# Patient Record
Sex: MALE | Race: ASIAN | NOT HISPANIC OR LATINO | Employment: FULL TIME | URBAN - METROPOLITAN AREA
[De-identification: names, ages, dates, MRNs, and addresses within clinical notes are randomized per-mention and may not be internally consistent; named-entity substitution may affect disease eponyms.]

---

## 2019-05-24 ENCOUNTER — ANCILLARY PROCEDURE (OUTPATIENT)
Dept: GENERAL RADIOLOGY | Facility: CLINIC | Age: 29
End: 2019-05-24
Attending: INTERNAL MEDICINE
Payer: COMMERCIAL

## 2019-05-24 ENCOUNTER — OFFICE VISIT (OUTPATIENT)
Dept: FAMILY MEDICINE | Facility: CLINIC | Age: 29
End: 2019-05-24
Payer: COMMERCIAL

## 2019-05-24 VITALS
BODY MASS INDEX: 21.67 KG/M2 | SYSTOLIC BLOOD PRESSURE: 124 MMHG | OXYGEN SATURATION: 99 % | HEART RATE: 78 BPM | HEIGHT: 67 IN | WEIGHT: 138.1 LBS | TEMPERATURE: 98.7 F | DIASTOLIC BLOOD PRESSURE: 70 MMHG

## 2019-05-24 DIAGNOSIS — R05.9 COUGH: ICD-10-CM

## 2019-05-24 DIAGNOSIS — J40 BRONCHITIS: ICD-10-CM

## 2019-05-24 DIAGNOSIS — Z13.29 SCREENING FOR THYROID DISORDER: ICD-10-CM

## 2019-05-24 DIAGNOSIS — R53.82 CHRONIC FATIGUE: ICD-10-CM

## 2019-05-24 DIAGNOSIS — Z83.3 FAMILY HISTORY OF DIABETES MELLITUS: ICD-10-CM

## 2019-05-24 DIAGNOSIS — R05.9 COUGH: Primary | ICD-10-CM

## 2019-05-24 DIAGNOSIS — Z13.1 SCREENING FOR DIABETES MELLITUS: ICD-10-CM

## 2019-05-24 LAB
ANION GAP SERPL CALCULATED.3IONS-SCNC: 9 MMOL/L (ref 3–14)
BASOPHILS # BLD AUTO: 0 10E9/L (ref 0–0.2)
BASOPHILS NFR BLD AUTO: 0.2 %
BUN SERPL-MCNC: 12 MG/DL (ref 7–30)
CALCIUM SERPL-MCNC: 9.9 MG/DL (ref 8.5–10.1)
CHLORIDE SERPL-SCNC: 102 MMOL/L (ref 94–109)
CO2 SERPL-SCNC: 28 MMOL/L (ref 20–32)
CREAT SERPL-MCNC: 0.89 MG/DL (ref 0.66–1.25)
DIFFERENTIAL METHOD BLD: NORMAL
EOSINOPHIL # BLD AUTO: 0.7 10E9/L (ref 0–0.7)
EOSINOPHIL NFR BLD AUTO: 6.5 %
ERYTHROCYTE [DISTWIDTH] IN BLOOD BY AUTOMATED COUNT: 14 % (ref 10–15)
GFR SERPL CREATININE-BSD FRML MDRD: >90 ML/MIN/{1.73_M2}
GLUCOSE SERPL-MCNC: 96 MG/DL (ref 70–99)
HBA1C MFR BLD: 5 % (ref 0–5.6)
HCT VFR BLD AUTO: 45.1 % (ref 40–53)
HGB BLD-MCNC: 15.3 G/DL (ref 13.3–17.7)
LYMPHOCYTES # BLD AUTO: 2.2 10E9/L (ref 0.8–5.3)
LYMPHOCYTES NFR BLD AUTO: 21.6 %
MCH RBC QN AUTO: 30.2 PG (ref 26.5–33)
MCHC RBC AUTO-ENTMCNC: 33.9 G/DL (ref 31.5–36.5)
MCV RBC AUTO: 89 FL (ref 78–100)
MONOCYTES # BLD AUTO: 0.6 10E9/L (ref 0–1.3)
MONOCYTES NFR BLD AUTO: 6.4 %
NEUTROPHILS # BLD AUTO: 6.6 10E9/L (ref 1.6–8.3)
NEUTROPHILS NFR BLD AUTO: 65.3 %
PLATELET # BLD AUTO: 226 10E9/L (ref 150–450)
POTASSIUM SERPL-SCNC: 3.4 MMOL/L (ref 3.4–5.3)
RBC # BLD AUTO: 5.06 10E12/L (ref 4.4–5.9)
SODIUM SERPL-SCNC: 139 MMOL/L (ref 133–144)
TSH SERPL DL<=0.005 MIU/L-ACNC: 2.68 MU/L (ref 0.4–4)
WBC # BLD AUTO: 10.1 10E9/L (ref 4–11)

## 2019-05-24 PROCEDURE — 83036 HEMOGLOBIN GLYCOSYLATED A1C: CPT | Performed by: INTERNAL MEDICINE

## 2019-05-24 PROCEDURE — 36415 COLL VENOUS BLD VENIPUNCTURE: CPT | Performed by: INTERNAL MEDICINE

## 2019-05-24 PROCEDURE — 99204 OFFICE O/P NEW MOD 45 MIN: CPT | Performed by: INTERNAL MEDICINE

## 2019-05-24 PROCEDURE — 84443 ASSAY THYROID STIM HORMONE: CPT | Performed by: INTERNAL MEDICINE

## 2019-05-24 PROCEDURE — 80048 BASIC METABOLIC PNL TOTAL CA: CPT | Performed by: INTERNAL MEDICINE

## 2019-05-24 PROCEDURE — 85025 COMPLETE CBC W/AUTO DIFF WBC: CPT | Performed by: INTERNAL MEDICINE

## 2019-05-24 PROCEDURE — 71046 X-RAY EXAM CHEST 2 VIEWS: CPT

## 2019-05-24 RX ORDER — PREDNISONE 20 MG/1
20 TABLET ORAL 2 TIMES DAILY
Qty: 20 TABLET | Refills: 1 | Status: SHIPPED | OUTPATIENT
Start: 2019-05-24 | End: 2019-05-30

## 2019-05-24 RX ORDER — PREDNISONE 20 MG/1
20 TABLET ORAL 2 TIMES DAILY
Qty: 20 TABLET | Refills: 1 | Status: SHIPPED | OUTPATIENT
Start: 2019-05-24 | End: 2019-05-24

## 2019-05-24 ASSESSMENT — MIFFLIN-ST. JEOR: SCORE: 1540.05

## 2019-05-24 NOTE — LETTER
[facility]  [address] [place] [address] [facility]  [address]  [place]  [address]  Tel: [phone]    May 30, 2019    Phuc Loo  [address]  [facility] [address]        Dear Mr. Loo,    The results of your recent labs are OK    If you have any further questions or problems, please contact our office.      Sincerely,    Asher Mullen MD/EMILIO          Enclosure: Lab Results  Results for orders placed or performed in visit on 05/24/19   Hemoglobin A1c   Result Value Ref Range    Hemoglobin A1C 5.0 0 - 5.6 %   CBC with platelets and differential   Result Value Ref Range    WBC 10.1 4.0 - 11.0 10e9/L    RBC Count 5.06 4.4 - 5.9 10e12/L    Hemoglobin 15.3 13.3 - 17.7 g/dL    Hematocrit 45.1 40.0 - 53.0 %    MCV 89 78 - 100 fl    MCH 30.2 26.5 - 33.0 pg    MCHC 33.9 31.5 - 36.5 g/dL    RDW 14.0 10.0 - 15.0 %    Platelet Count 226 150 - 450 10e9/L    % Neutrophils 65.3 %    % Lymphocytes 21.6 %    % Monocytes 6.4 %    % Eosinophils 6.5 %    % Basophils 0.2 %    Absolute Neutrophil 6.6 1.6 - 8.3 10e9/L    Absolute Lymphocytes 2.2 0.8 - 5.3 10e9/L    Absolute Monocytes 0.6 0.0 - 1.3 10e9/L    Absolute Eosinophils 0.7 0.0 - 0.7 10e9/L    Absolute Basophils 0.0 0.0 - 0.2 10e9/L    Diff Method Automated Method    Basic metabolic panel  (Ca, Cl, CO2, Creat, Gluc, K, Na, BUN)   Result Value Ref Range    Sodium 139 133 - 144 mmol/L    Potassium 3.4 3.4 - 5.3 mmol/L    Chloride 102 94 - 109 mmol/L    Carbon Dioxide 28 20 - 32 mmol/L    Anion Gap 9 3 - 14 mmol/L    Glucose 96 70 - 99 mg/dL    Urea Nitrogen 12 7 - 30 mg/dL    Creatinine 0.89 0.66 - 1.25 mg/dL    GFR Estimate >90 >60 mL/min/[1.73_m2]    GFR Estimate If Black >90 >60 mL/min/[1.73_m2]    Calcium 9.9 8.5 - 10.1 mg/dL   TSH with free T4 reflex   Result Value Ref Range    TSH 2.68 0.40 - 4.00 mU/L

## 2019-05-24 NOTE — PROGRESS NOTES
Chief Complaint:     Multiple concerns including cough, fatigue    HPI:   Patient Phuc Loo is a very pleasant 29 year old male with history of recent cough symptoms who presents to Internal Medicine clinic today for evaluation of multiple concerns including cough, fatigue. Regarding the patient's recent cough, the patient complains of intermittent dry coughing spells worse at night for the past several weeks. Patient denies any fever or chills. Regarding the patient's chronic fatigue symptoms, the patient is due for TSH lab to screen for thyroid disorder. The patient is also due for diabetes screening due to his positive family history of diabetes, the patient's father has diabetes and takes insulin treatment.       Current Medications:     Current Outpatient Medications   Medication Sig Dispense Refill     predniSONE (DELTASONE) 20 MG tablet Take 1 tablet (20 mg) by mouth 2 times daily 20 tablet 1         Allergies:    No Known Allergies         Past Medical History:     Past Medical History:   Diagnosis Date     Cough      Fatigue          Past Surgical History:   No past surgical history on file.      Family Medical History:     Family History   Problem Relation Age of Onset     Diabetes Father          Social History:     Social History     Socioeconomic History     Marital status:      Spouse name: Not on file     Number of children: Not on file     Years of education: Not on file     Highest education level: Not on file   Occupational History     Not on file   Social Needs     Financial resource strain: Not on file     Food insecurity:     Worry: Not on file     Inability: Not on file     Transportation needs:     Medical: Not on file     Non-medical: Not on file   Tobacco Use     Smoking status: Current Some Day Smoker     Smokeless tobacco: Never Used   Substance and Sexual Activity     Alcohol use: Not on file     Drug use: Not on file     Sexual activity: Not on file   Lifestyle     Physical  "activity:     Days per week: Not on file     Minutes per session: Not on file     Stress: Not on file   Relationships     Social connections:     Talks on phone: Not on file     Gets together: Not on file     Attends Latter day service: Not on file     Active member of club or organization: Not on file     Attends meetings of clubs or organizations: Not on file     Relationship status: Not on file     Intimate partner violence:     Fear of current or ex partner: Not on file     Emotionally abused: Not on file     Physically abused: Not on file     Forced sexual activity: Not on file   Other Topics Concern     Not on file   Social History Narrative     Not on file           Review of System:     Constitutional: Negative for fever or chills, positive for chronic fatigue  Skin: Negative for rashes  Ears/Nose/Throat: Negative for nasal congestion, sore throat  Respiratory: positive for cough  Cardiovascular: Negative for chest pain  Gastrointestinal: Negative for nausea, vomiting  Genitourinary: Negative for dysuria, hematuria  Musculoskeletal: Negative for myalgias  Neurologic: Negative for headaches  Psychiatric: Negative for depression, anxiety  Hematologic/Lymphatic/Immunologic: Negative  Endocrine: Negative  Behavioral: Negative for tobacco use       Physical Exam:   /70 (BP Location: Right arm, Patient Position: Sitting, Cuff Size: Adult Regular)   Pulse 78   Temp 98.7  F (37.1  C) (Oral)   Ht 1.702 m (5' 7\")   Wt 62.6 kg (138 lb 1.6 oz)   SpO2 99%   BMI 21.63 kg/m      GENERAL:  Afebrile, alert and no distress  EYES: eyes grossly normal to inspection, and conjunctivae and sclerae normal  HENT: Normocephalic atraumatic. Nose and mouth without ulcers or lesions  NECK: supple  RESP: intermittent dry coughing spells present  CV: regular rate and rhythm, normal S1 S2  LYMPH: no peripheral edema   ABDOMEN: nondistended  MS: no gross musculoskeletal defects noted  SKIN: no suspicious lesions or rashes  NEURO: " Alert & Oriented x 3.   PSYCH: mentation appears normal, affect normal        Diagnostic Test Results:     Diagnostic Test Results:  No results found for this or any previous visit.    ASSESSMENT/PLAN:     (J40) Bronchitis  (R05) Cough  (primary encounter diagnosis)  Comment: recent cough symptoms concerning for bronchitis  Plan: XR Chest 2 Views, predniSONE (DELTASONE) 20 MG         tablet, CBC with platelets and differential,         Basic metabolic panel  (Ca, Cl, CO2, Creat,         Gluc, K, Na, BUN)      (Z83.3) Family history of diabetes mellitus  (Z13.1) Screening for diabetes mellitus  Comment: patient's father has diabetes, treated with insulin. Patient is due for diabetes screening.  Plan: Hemoglobin A1c      (Z13.29) Screening for thyroid disorder  (R53.82) Chronic fatigue  Comment: chronic fatigue symptoms, patient is due for TSH lab to screen for thyroid disorder  Plan: TSH with free T4 reflex          Follow Up Plan:     Patient is instructed to return to Internal Medicine clinic for follow-up visit in 1 week.        Indu Mullen MD  Internal Medicine  [facility]

## 2019-05-30 ENCOUNTER — OFFICE VISIT (OUTPATIENT)
Dept: FAMILY MEDICINE | Facility: CLINIC | Age: 29
End: 2019-05-30
Payer: COMMERCIAL

## 2019-05-30 VITALS
OXYGEN SATURATION: 94 % | DIASTOLIC BLOOD PRESSURE: 65 MMHG | HEIGHT: 67 IN | TEMPERATURE: 100 F | SYSTOLIC BLOOD PRESSURE: 124 MMHG | HEART RATE: 80 BPM | WEIGHT: 139.4 LBS | BODY MASS INDEX: 21.88 KG/M2

## 2019-05-30 DIAGNOSIS — J45.909 REACTIVE AIRWAY DISEASE WITHOUT COMPLICATION, UNSPECIFIED ASTHMA SEVERITY, UNSPECIFIED WHETHER PERSISTENT: ICD-10-CM

## 2019-05-30 DIAGNOSIS — J20.9 ACUTE BRONCHITIS, UNSPECIFIED ORGANISM: ICD-10-CM

## 2019-05-30 DIAGNOSIS — R05.3 CHRONIC COUGH: Primary | ICD-10-CM

## 2019-05-30 DIAGNOSIS — R05.9 COUGH: ICD-10-CM

## 2019-05-30 DIAGNOSIS — Z71.6 TOBACCO ABUSE COUNSELING: ICD-10-CM

## 2019-05-30 DIAGNOSIS — Z72.0 TOBACCO ABUSE: ICD-10-CM

## 2019-05-30 PROCEDURE — 99214 OFFICE O/P EST MOD 30 MIN: CPT | Performed by: INTERNAL MEDICINE

## 2019-05-30 RX ORDER — PREDNISONE 20 MG/1
20 TABLET ORAL 2 TIMES DAILY
Qty: 20 TABLET | Refills: 1 | Status: SHIPPED | OUTPATIENT
Start: 2019-05-30 | End: 2019-05-30

## 2019-05-30 RX ORDER — PREDNISONE 20 MG/1
20 TABLET ORAL 2 TIMES DAILY
Qty: 20 TABLET | Refills: 1 | Status: SHIPPED | OUTPATIENT
Start: 2019-05-30 | End: 2020-06-19

## 2019-05-30 ASSESSMENT — MIFFLIN-ST. JEOR: SCORE: 1545.94

## 2019-05-30 NOTE — PROGRESS NOTES
Chief Complaint:     Follow up on cough, bronchitis    HPI:   Patient Phuc Loo is a very pleasant 29 year old male with history of chronic tobacco dependence since high school who presents to Internal Medicine clinic today for follow up on cough, bronchitis. Regarding the patient's cough symptoms following recent bronchitis symptoms, the patient reports improvement in his cough symptoms with prednisone therapy since last clinic visit. He is interested in an evaluation by the [facility] clinic in [place] going forward to screen for reactive airway disease. He denies any chest pain, headaches, fever or chills. Regarding the patient's chronic tobacco dependence, the patient reports that he is only smoking about 1 cigarette per day.           Current Medications:     Current Outpatient Medications   Medication Sig Dispense Refill     predniSONE (DELTASONE) 20 MG tablet Take 1 tablet (20 mg) by mouth 2 times daily 20 tablet 1         Allergies:    No Known Allergies         Past Medical History:     Past Medical History:   Diagnosis Date     Cough      Fatigue          Past Surgical History:   No past surgical history on file.      Family Medical History:     Family History   Problem Relation Age of Onset     Diabetes Father          Social History:     Social History     Socioeconomic History     Marital status:      Spouse name: Not on file     Number of children: Not on file     Years of education: Not on file     Highest education level: Not on file   Occupational History     Not on file   Social Needs     Financial resource strain: Not on file     Food insecurity:     Worry: Not on file     Inability: Not on file     Transportation needs:     Medical: Not on file     Non-medical: Not on file   Tobacco Use     Smoking status: Current Some Day Smoker     Smokeless tobacco: Never Used   Substance and Sexual Activity     Alcohol use: Yes     Comment: 1-2 drinks per month     Drug use: Not Currently  "    Sexual activity: Yes     Partners: Female   Lifestyle     Physical activity:     Days per week: Not on file     Minutes per session: Not on file     Stress: Not on file   Relationships     Social connections:     Talks on phone: Not on file     Gets together: Not on file     Attends Temple service: Not on file     Active member of club or organization: Not on file     Attends meetings of clubs or organizations: Not on file     Relationship status: Not on file     Intimate partner violence:     Fear of current or ex partner: Not on file     Emotionally abused: Not on file     Physically abused: Not on file     Forced sexual activity: Not on file   Other Topics Concern     Not on file   Social History Narrative     Not on file           Review of System:     Constitutional: Negative for fever or chills  Skin: Negative for rashes  Ears/Nose/Throat: Negative for nasal congestion, sore throat  Respiratory: positive for cough  Cardiovascular: Negative for chest pain  Gastrointestinal: Negative for nausea, vomiting  Genitourinary: Negative for dysuria, hematuria  Musculoskeletal: Negative for myalgias  Neurologic: Negative for headaches  Psychiatric: Negative for depression, anxiety  Hematologic/Lymphatic/Immunologic: Negative  Endocrine: Negative  Behavioral: positive for tobacco use       Physical Exam:   /65 (BP Location: Left arm, Cuff Size: Adult Regular)   Pulse 80   Temp 100  F (37.8  C) (Oral)   Ht 1.702 m (5' 7\")   Wt 63.2 kg (139 lb 6.4 oz)   SpO2 94%   BMI 21.83 kg/m      GENERAL: alert and no distress  EYES: eyes grossly normal to inspection, and conjunctivae and sclerae normal  HENT: Normocephalic atraumatic. Nose and mouth without ulcers or lesions  NECK: supple  RESP: intermittent dry cough spells present, although improved since last clinic visit.  CV: regular rate and rhythm, normal S1 S2  LYMPH: no peripheral edema   ABDOMEN: nondistended  MS: no gross musculoskeletal defects " noted  SKIN: no suspicious lesions or rashes  NEURO: Alert & Oriented x 3.   PSYCH: mentation appears normal, affect normal        Diagnostic Test Results:     Diagnostic Test Results:  Results for orders placed or performed in visit on 05/24/19   XR Chest 2 Views    Narrative    CHEST TWO VIEWS 5/24/2019 4:25 PM     HISTORY: Cough. Bronchitis.    COMPARISON: None.     FINDINGS: There are no acute infiltrates. The cardiac silhouette is  not enlarged. Pulmonary vasculature is unremarkable.      Impression    IMPRESSION: No acute disease.    FLO COMBS MD       ASSESSMENT/PLAN:     (J20.9) Acute bronchitis, unspecified organism  (J45.909) Reactive airway disease without complication, unspecified asthma severity, unspecified whether persistent  (R05) Chronic cough  (primary encounter diagnosis)  Comment: cough symptoms improved with prednisone  Plan: PULMONARY MEDICINE REFERRAL, predniSONE (DELTASONE) 20 MG tablet      (Z71.6) Tobacco abuse counseling  (Z72.0) Tobacco abuse  Comment: chronic tobacco dependence, patient reports that lately he usually only smokes about 1 cigarettes per day  Plan: Tobacco Cessation - Order to Satisfy Health         Maintenance, TOBACCO CESSATION ORDER FOR              Follow Up Plan:     Patient is instructed to return to Internal Medicine clinic for follow-up visit in 1 month.        Indu Mullen MD  Internal Medicine  [facility]

## 2019-05-30 NOTE — PATIENT INSTRUCTIONS

## 2019-06-06 ENCOUNTER — TELEPHONE (OUTPATIENT)
Dept: FAMILY MEDICINE | Facility: CLINIC | Age: 29
End: 2019-06-06

## 2019-06-06 NOTE — LETTER
"[facility]  [address] [facility]  [address]  [place]  [address]  Tel: [phone]    June 6, 2019    Phuc Loo  [address]  [facility] [address]        Dear Mr. Loo,    We postponed doing your Asthma Control Test when you were in for your recent appointment.     When you are feeling better please complete the enclosed \"ACT\"and either     mail back to us  or  fax [phone] back to us  or  call [phone] us with your answers (ask for any medical  assist).     Due to copyright laws we are unable to ask you the questions over the phone without the form visible to you.         Sincerely,    Ricarda VERGARA MA on behalf of Asher Mullen MD        Enc: ACT form      "

## 2019-06-06 NOTE — TELEPHONE ENCOUNTER
ACT Asthma Control Test not done or postponed at recent appt per weekly quality report.  Mailed to pt.  Unable to do over the phone  due to copyright laws.  Ricarda Means MA

## 2020-03-11 ENCOUNTER — HEALTH MAINTENANCE LETTER (OUTPATIENT)
Age: 30
End: 2020-03-11

## 2020-06-19 ENCOUNTER — HOSPITAL ENCOUNTER (EMERGENCY)
Facility: CLINIC | Age: 30
Discharge: HOME | End: 2020-06-19
Attending: EMERGENCY MEDICINE | Admitting: EMERGENCY MEDICINE
Payer: COMMERCIAL

## 2020-06-19 VITALS
OXYGEN SATURATION: 100 % | TEMPERATURE: 98.6 F | DIASTOLIC BLOOD PRESSURE: 68 MMHG | SYSTOLIC BLOOD PRESSURE: 125 MMHG | WEIGHT: 125 LBS | RESPIRATION RATE: 16 BRPM | BODY MASS INDEX: 19.58 KG/M2 | HEART RATE: 65 BPM

## 2020-06-19 DIAGNOSIS — L50.9 HIVES: ICD-10-CM

## 2020-06-19 PROCEDURE — 99282 EMERGENCY DEPT VISIT SF MDM: CPT

## 2020-06-19 RX ORDER — PREDNISONE 20 MG/1
TABLET ORAL
Qty: 18 TABLET | Refills: 0 | Status: SHIPPED | OUTPATIENT
Start: 2020-06-19 | End: 2021-05-13

## 2020-06-19 ASSESSMENT — ENCOUNTER SYMPTOMS
VOMITING: 0
WHEEZING: 0
CHILLS: 0
HEADACHES: 0
NAUSEA: 0
FEVER: 0
STRIDOR: 0
COUGH: 0
SHORTNESS OF BREATH: 0

## 2020-06-19 NOTE — ED PROVIDER NOTES
History     Chief Complaint:  Rash    The history is provided by the patient.      Phuc Loo is a 30 year old otherwise healthy male who presents alone for evaluation of diffuse full body rash, more localized to his extremities, with itching for the past 2 to 3 days.  The only thing that has changed is that they just bought a whole comforter and bedsheet set for their bed and they did not wash the sheets.  Otherwise no new exposures or foods.  He has had no shortness of breath, no mouth or tongue swelling, no difficulty breathing.  It has been moving around his body but has been almost everywhere.  Heat makes it itch more.  He has not had any GI upset or vomiting.  No history of anaphylaxis.  No other associated signs or symptoms but he picked up yesterday an over-the-counter allergy tablet and he took it and it seemed to help quite a bit.  The last 2 days it always got better during the day and then got worse overnight into the morning.    Allergies:  No Known Drug Allergies     Medications:    The patient is not currently taking any prescribed medications.     Past Medical History:    History reviewed. No pertinent past medical history.     Past Surgical History:    History reviewed. No pertinent past surgical history.     Family History:    Father - diabetes    Social History:  The patient was unaccompanied to the ED.  Smoking Status: Yes - current some day smoker  Smokeless Tobacco: Never  Alcohol Use: Yes  Drug Use: Not currently   Marital Status:   [2]     Review of Systems   Constitutional: Negative for chills and fever.   Respiratory: Negative for cough, shortness of breath, wheezing and stridor.    Cardiovascular: Negative for chest pain.   Gastrointestinal: Negative for nausea and vomiting.   Skin: Positive for rash.   Neurological: Negative for headaches.   All other systems reviewed and are negative.      Physical Exam     Patient Vitals for the past 24 hrs:   BP Temp Temp src Pulse Resp SpO2  Weight   06/19/20 1353 125/68 98.6  F (37  C) Oral 65 16 100 % 56.7 kg (125 lb)       Physical Exam  Nursing note and vitals reviewed.    Constitutional:  Appears comfortable.    HENT:    Nose normal.  No discharge.      Oral mucosa is moist.  No tongue or lip swelling.  Eyes:    No periorbital swelling.  Lymph:  No enlarged or tender cervical or submandibular lymph nodes.   Cardiovascular:  Normal rate, regular rhythm with normal S1 and S2.      Normal heart sounds and peripheral pulses 2+ and equal.       No murmur or perla.  Pulmonary:  Effort normal and breath sounds clear to auscultation bilaterally.     No respiratory distress.  No stridor.  Voice is normal.     No wheezes. No rales.     GI:    Soft. No distension and no mass. No tenderness.   Musculoskeletal:  Normal range of motion. No extremity deformity.     No edema and no tenderness.    Skin:    Skin is warm and dry.  Scattered hives over his entire body.  No vesicles or pustules.  Psychiatric:   Behavior is normal. Appropriate mood and affect.     Judgment and thought content normal.       Emergency Department Course     Emergency Department Course:  Past medical records, nursing notes, and vitals reviewed.    (1410)   I performed an exam of the patient as documented above. History obtained from patient. Discussed clinical findings and plan of care and patient will be discharged.     Findings and plan explained to the Patient. Patient discharged home with instructions regarding supportive care, medications, and reasons to return. The importance of close follow-up was reviewed. The patient was prescribed Prednisone. I personally answered all related questions prior to discharge.     Impression & Plan   Medical Decision Making:  Patient comes in with hives.  He has no other systemic symptoms.  I believe this is secondary to the sheets that he bought for he and his wife's bed and they did not wash them.  His hives are so extensive that I am going to put  him on a 9-day burst and taper of steroid and have him get on an antihistamine.  He also needs to wash all of his sheets and leave the comforter off for 3 to 4 days.  If these are not resolving then he should follow-up in the clinic with his doctor.    Diagnosis:    ICD-10-CM    1. Hives  L50.9        Disposition:  Discharged to home.    Avoid heat, take either Claritin or Zyrtec twice a day or you could take 1 of these in the morning and Benadryl at bedtime.  Prednisone as directed for a total of 9 days.  Take all of the sheets and pillowcases off your bed and wash them thoroughly and leave the comforter off for 3 to 4 days.  Echeck if you have any further problems otherwise follow-up as needed.    Discharge Medications:  Discharge Medication List as of 6/19/2020  2:45 PM          Scribe Disclosure:  Laxmi FRANCO, am serving as a scribe at 2:02 PM on 6/19/2020 to document services personally performed by Mary Pineda MD based on my observations and the provider's statements to me.    EMERGENCY DEPARTMENT       Mary Pineda MD  06/19/20 3409

## 2020-06-19 NOTE — ED TRIAGE NOTES
Rash throughout entire body with SOB.  Hive appearing.  States lessening to trunk, more on extremities.  No breathing difficulties.

## 2020-06-19 NOTE — ED AVS SNAPSHOT
Emergency Department  [address] [facility] [address]  Phone:  [phone]  Fax:  [phone]                                    Phuc Loo   MRN: [id number]    Department:   Emergency Department   Date of Visit:  6/19/2020           After Visit Summary Signature Page    I have received my discharge instructions, and my questions have been answered. I have discussed any challenges I see with this plan with the nurse or doctor.    ..........................................................................................................................................  Patient/Patient Representative Signature      ..........................................................................................................................................  Patient Representative Print Name and Relationship to Patient    ..................................................               ................................................  Date                                   Time    ..........................................................................................................................................  Reviewed by Signature/Title    ...................................................              ..............................................  Date                                               Time          22EPIC Rev 08/18

## 2020-06-19 NOTE — DISCHARGE INSTRUCTIONS
Avoid heat, take either Claritin or Zyrtec twice a day or you could take 1 of these in the morning and Benadryl at bedtime.  Prednisone as directed for a total of 9 days.  Take all of the sheets and pillowcases off your bed and wash them thoroughly and leave the comforter off for 3 to 4 days.  Echeck if you have any further problems otherwise follow-up as needed.

## 2021-01-03 ENCOUNTER — HEALTH MAINTENANCE LETTER (OUTPATIENT)
Age: 31
End: 2021-01-03

## 2021-04-23 ENCOUNTER — IMMUNIZATION (OUTPATIENT)
Dept: PEDIATRICS | Facility: CLINIC | Age: 31
End: 2021-04-23
Payer: COMMERCIAL

## 2021-04-23 PROCEDURE — 91300 PR COVID VAC PFIZER DIL RECON 30 MCG/0.3 ML IM: CPT

## 2021-04-23 PROCEDURE — 0001A PR COVID VAC PFIZER DIL RECON 30 MCG/0.3 ML IM: CPT

## 2021-04-25 ENCOUNTER — HEALTH MAINTENANCE LETTER (OUTPATIENT)
Age: 31
End: 2021-04-25

## 2021-05-13 ENCOUNTER — OFFICE VISIT (OUTPATIENT)
Dept: FAMILY MEDICINE | Facility: CLINIC | Age: 31
End: 2021-05-13
Payer: COMMERCIAL

## 2021-05-13 VITALS
HEART RATE: 72 BPM | SYSTOLIC BLOOD PRESSURE: 125 MMHG | HEIGHT: 67 IN | DIASTOLIC BLOOD PRESSURE: 78 MMHG | TEMPERATURE: 97.2 F | OXYGEN SATURATION: 97 % | WEIGHT: 147.9 LBS | BODY MASS INDEX: 23.21 KG/M2

## 2021-05-13 DIAGNOSIS — H10.13 ALLERGIC CONJUNCTIVITIS, BILATERAL: ICD-10-CM

## 2021-05-13 DIAGNOSIS — Z71.6 TOBACCO ABUSE COUNSELING: ICD-10-CM

## 2021-05-13 DIAGNOSIS — J45.909 REACTIVE AIRWAY DISEASE WITHOUT COMPLICATION, UNSPECIFIED ASTHMA SEVERITY, UNSPECIFIED WHETHER PERSISTENT: ICD-10-CM

## 2021-05-13 DIAGNOSIS — Z72.0 TOBACCO ABUSE: ICD-10-CM

## 2021-05-13 DIAGNOSIS — J30.2 SEASONAL ALLERGIC RHINITIS, UNSPECIFIED TRIGGER: Primary | ICD-10-CM

## 2021-05-13 PROCEDURE — 99214 OFFICE O/P EST MOD 30 MIN: CPT | Performed by: INTERNAL MEDICINE

## 2021-05-13 RX ORDER — FEXOFENADINE HCL 180 MG/1
180 TABLET ORAL DAILY
COMMUNITY
End: 2021-06-24

## 2021-05-13 RX ORDER — PREDNISONE 20 MG/1
20 TABLET ORAL 2 TIMES DAILY
Qty: 20 TABLET | Refills: 1 | Status: SHIPPED | OUTPATIENT
Start: 2021-05-13 | End: 2021-05-29

## 2021-05-13 RX ORDER — OLOPATADINE HYDROCHLORIDE 1 MG/ML
1 SOLUTION/ DROPS OPHTHALMIC 2 TIMES DAILY
Qty: 5 ML | Refills: 4 | Status: SHIPPED | OUTPATIENT
Start: 2021-05-13 | End: 2021-10-08

## 2021-05-13 ASSESSMENT — MIFFLIN-ST. JEOR: SCORE: 1574.5

## 2021-05-13 NOTE — PROGRESS NOTES
Subjective   Phuc is a 31 year old who presents for the following health issues     HPI       Chief Complaint:     Follow up on allergic rhinitis, allergic conjunctivitis    HPI:   Patient Phuc Loo is a very pleasant 31 year old male with history of chronic tobacco dependence since high school who presents to Internal Medicine clinic today for follow up on allergy symptoms ongoing for 2 weeks with no resolution. Congestion, itchy eyes. Regarding the patient's previous cough symptoms following bronchitis symptoms, the patient reports improvement in his cough symptoms with prednisone therapy at his last clinic visit. He denies any chest pain, headaches, fever or chills. Regarding the patient's chronic tobacco dependence, the patient reports that he is still currently smoking cigarettes.      Current Medications:     Current Outpatient Medications   Medication Sig Dispense Refill     fexofenadine (ALLEGRA) 180 MG tablet Take 180 mg by mouth daily       olopatadine (PATANOL) 0.1 % ophthalmic solution Place 1 drop into both eyes 2 times daily 5 mL 4     predniSONE (DELTASONE) 20 MG tablet Take 1 tablet (20 mg) by mouth 2 times daily for 10 days 20 tablet 1         Allergies:    No Known Allergies         Past Medical History:     Past Medical History:   Diagnosis Date     Cough      Fatigue          Past Surgical History:   History reviewed. No pertinent surgical history.      Family Medical History:     Family History   Problem Relation Age of Onset     Diabetes Father          Social History:     Social History     Socioeconomic History     Marital status:      Spouse name: Not on file     Number of children: Not on file     Years of education: Not on file     Highest education level: Not on file   Occupational History     Not on file   Social Needs     Financial resource strain: Not on file     Food insecurity     Worry: Not on file     Inability: Not on file     Transportation needs     Medical: Not on  "file     Non-medical: Not on file   Tobacco Use     Smoking status: Current Some Day Smoker     Smokeless tobacco: Never Used   Substance and Sexual Activity     Alcohol use: Yes     Comment: 1-2 drinks per month     Drug use: Not Currently     Sexual activity: Yes     Partners: Female   Lifestyle     Physical activity     Days per week: Not on file     Minutes per session: Not on file     Stress: Not on file   Relationships     Social connections     Talks on phone: Not on file     Gets together: Not on file     Attends Nond[employer]ational service: Not on file     Active member of club or organization: Not on file     Attends meetings of clubs or organizations: Not on file     Relationship status: Not on file     Intimate partner violence     Fear of current or ex partner: Not on file     Emotionally abused: Not on file     Physically abused: Not on file     Forced sexual activity: Not on file   Other Topics Concern     Not on file   Social History Narrative     Not on file           Review of System:     Constitutional: Negative for fever or chills  Skin: Negative for rashes  Eyes: positive for allergic rhinitis symptoms with red, itchy, watery eyes  Ears/Nose/Throat: positive for nasal congestion  Respiratory: positive for cough  Cardiovascular: Negative for chest pain  Gastrointestinal: Negative for nausea, vomiting  Genitourinary: Negative for dysuria, hematuria  Musculoskeletal: Negative for myalgias  Neurologic: Negative for headaches  Psychiatric: Negative for depression, anxiety  Hematologic/Lymphatic/Immunologic: Negative  Endocrine: Negative  Behavioral: positive for tobacco use       Physical Exam:   /78 (BP Location: Right arm, Patient Position: Sitting, Cuff Size: Adult Regular)   Pulse 72   Temp 97.2  F (36.2  C) (Temporal)   Ht 1.702 m (5' 7\")   Wt 67.1 kg (147 lb 14.4 oz)   SpO2 97%   BMI 23.16 kg/m      GENERAL: alert and no distress  EYES: allergic conjunctivitis symptoms noted  HENT: " Normocephalic atraumatic. Nose and mouth without ulcers or lesions, nasal congestion present  NECK: supple  RESP: intermittent dry cough spells present  CV: regular rate and rhythm, normal S1 S2  LYMPH: no peripheral edema   ABDOMEN: nondistended  MS: no gross musculoskeletal defects noted  SKIN: no suspicious lesions or rashes  NEURO: Alert & Oriented x 3.   PSYCH: mentation appears normal, affect normal        Diagnostic Test Results:     Diagnostic Test Results:  Labs reviewed in Epic    ASSESSMENT/PLAN:     (J45.909) Reactive airway disease without complication, unspecified asthma severity, unspecified whether persistent  (J30.2) Seasonal allergic rhinitis, unspecified trigger  (primary encounter diagnosis)  Comment: not well controlled  Plan: predniSONE (DELTASONE) 20 MG tablet      (H10.13) Allergic conjunctivitis, bilateral  Comment: not well controlled  Plan: olopatadine (PATANOL) 0.1 % ophthalmic solution      (Z71.6) Tobacco abuse counseling  (Z72.0) Tobacco abuse  Comment: chronic tobacco dependence  Plan: I have strongly advised the patient to stop smoking tobacco ASAP going forward.       Follow Up Plan:     Patient is instructed to return to Internal Medicine clinic for follow-up visit in 1 week.        Indu Mullen MD  Internal Medicine  [facility]

## 2021-05-14 ENCOUNTER — IMMUNIZATION (OUTPATIENT)
Dept: PEDIATRICS | Facility: CLINIC | Age: 31
End: 2021-05-14
Attending: INTERNAL MEDICINE
Payer: COMMERCIAL

## 2021-05-14 PROCEDURE — 0002A PR COVID VAC PFIZER DIL RECON 30 MCG/0.3 ML IM: CPT

## 2021-05-14 PROCEDURE — 91300 PR COVID VAC PFIZER DIL RECON 30 MCG/0.3 ML IM: CPT

## 2021-06-08 LAB
BASOPHILS # BLD AUTO: 0 10E9/L (ref 0–0.2)
BASOPHILS NFR BLD AUTO: 0.3 %
DIFFERENTIAL METHOD BLD: NORMAL
EOSINOPHIL # BLD AUTO: 0.1 10E9/L (ref 0–0.7)
EOSINOPHIL NFR BLD AUTO: 1.2 %
ERYTHROCYTE [DISTWIDTH] IN BLOOD BY AUTOMATED COUNT: 12.6 % (ref 10–15)
HCT VFR BLD AUTO: 44 % (ref 40–53)
HGB BLD-MCNC: 14.4 G/DL (ref 13.3–17.7)
IMM GRANULOCYTES # BLD: 0 10E9/L (ref 0–0.4)
IMM GRANULOCYTES NFR BLD: 0.3 %
LYMPHOCYTES # BLD AUTO: 1.2 10E9/L (ref 0.8–5.3)
LYMPHOCYTES NFR BLD AUTO: 16 %
MCH RBC QN AUTO: 29.2 PG (ref 26.5–33)
MCHC RBC AUTO-ENTMCNC: 32.7 G/DL (ref 31.5–36.5)
MCV RBC AUTO: 89 FL (ref 78–100)
MONOCYTES # BLD AUTO: 0.5 10E9/L (ref 0–1.3)
MONOCYTES NFR BLD AUTO: 6.5 %
NEUTROPHILS # BLD AUTO: 5.7 10E9/L (ref 1.6–8.3)
NEUTROPHILS NFR BLD AUTO: 75.7 %
NRBC # BLD AUTO: 0 10*3/UL
NRBC BLD AUTO-RTO: 0 /100
PLATELET # BLD AUTO: 198 10E9/L (ref 150–450)
RBC # BLD AUTO: 4.93 10E12/L (ref 4.4–5.9)
WBC # BLD AUTO: 7.5 10E9/L (ref 4–11)

## 2021-06-08 PROCEDURE — 82550 ASSAY OF CK (CPK): CPT | Performed by: EMERGENCY MEDICINE

## 2021-06-08 PROCEDURE — 85025 COMPLETE CBC W/AUTO DIFF WBC: CPT | Performed by: EMERGENCY MEDICINE

## 2021-06-08 PROCEDURE — 99284 EMERGENCY DEPT VISIT MOD MDM: CPT | Mod: 25

## 2021-06-08 PROCEDURE — 84484 ASSAY OF TROPONIN QUANT: CPT | Performed by: EMERGENCY MEDICINE

## 2021-06-08 PROCEDURE — 80048 BASIC METABOLIC PNL TOTAL CA: CPT | Performed by: EMERGENCY MEDICINE

## 2021-06-08 PROCEDURE — 93005 ELECTROCARDIOGRAM TRACING: CPT

## 2021-06-08 ASSESSMENT — MIFFLIN-ST. JEOR: SCORE: 1561.35

## 2021-06-09 ENCOUNTER — HOSPITAL ENCOUNTER (EMERGENCY)
Facility: CLINIC | Age: 31
Discharge: HOME | End: 2021-06-09
Attending: EMERGENCY MEDICINE | Admitting: EMERGENCY MEDICINE
Payer: COMMERCIAL

## 2021-06-09 VITALS
WEIGHT: 145 LBS | SYSTOLIC BLOOD PRESSURE: 124 MMHG | TEMPERATURE: 98.4 F | OXYGEN SATURATION: 100 % | BODY MASS INDEX: 22.76 KG/M2 | HEIGHT: 67 IN | HEART RATE: 78 BPM | DIASTOLIC BLOOD PRESSURE: 68 MMHG | RESPIRATION RATE: 16 BRPM

## 2021-06-09 DIAGNOSIS — T67.9XXA HEAT EFFECT, INITIAL ENCOUNTER: ICD-10-CM

## 2021-06-09 DIAGNOSIS — E86.0 DEHYDRATION: ICD-10-CM

## 2021-06-09 LAB
ANION GAP SERPL CALCULATED.3IONS-SCNC: 6 MMOL/L (ref 3–14)
BUN SERPL-MCNC: 13 MG/DL (ref 7–30)
CALCIUM SERPL-MCNC: 9.3 MG/DL (ref 8.5–10.1)
CHLORIDE SERPL-SCNC: 103 MMOL/L (ref 94–109)
CK SERPL-CCNC: 86 U/L (ref 30–300)
CO2 SERPL-SCNC: 27 MMOL/L (ref 20–32)
CREAT SERPL-MCNC: 0.97 MG/DL (ref 0.66–1.25)
GFR SERPL CREATININE-BSD FRML MDRD: >90 ML/MIN/{1.73_M2}
GLUCOSE SERPL-MCNC: 135 MG/DL (ref 70–99)
POTASSIUM SERPL-SCNC: 3.9 MMOL/L (ref 3.4–5.3)
SODIUM SERPL-SCNC: 136 MMOL/L (ref 133–144)
TROPONIN I SERPL-MCNC: <0.015 UG/L (ref 0–0.04)

## 2021-06-09 PROCEDURE — 258N000003 HC RX IP 258 OP 636: Performed by: EMERGENCY MEDICINE

## 2021-06-09 PROCEDURE — 96360 HYDRATION IV INFUSION INIT: CPT

## 2021-06-09 PROCEDURE — 250N000013 HC RX MED GY IP 250 OP 250 PS 637: Performed by: EMERGENCY MEDICINE

## 2021-06-09 RX ORDER — MECLIZINE HYDROCHLORIDE 25 MG/1
25 TABLET ORAL ONCE
Status: COMPLETED | OUTPATIENT
Start: 2021-06-09 | End: 2021-06-09

## 2021-06-09 RX ADMIN — MECLIZINE HYDROCHLORIDE 25 MG: 25 TABLET ORAL at 01:52

## 2021-06-09 RX ADMIN — SODIUM CHLORIDE 1000 ML: 9 INJECTION, SOLUTION INTRAVENOUS at 01:51

## 2021-06-09 ASSESSMENT — ENCOUNTER SYMPTOMS
ABDOMINAL PAIN: 0
COUGH: 0
FEVER: 0
SHORTNESS OF BREATH: 0
CHILLS: 0
VOMITING: 1
DIARRHEA: 0
HEADACHES: 0
WEAKNESS: 0
NECK STIFFNESS: 1
NUMBNESS: 0
DIZZINESS: 1

## 2021-06-09 NOTE — ED PROVIDER NOTES
"  History   Chief Complaint:  Dizziness       HPI   Phuc Loo is a 31 year old male who presents with dizziness. The patient reports that he had been working outside in the sun all day today for his job. Around 1330 this afternoon, he states that he experienced a sudden onset of dizziness that felt as though he was about to \"pass out.\" In addition, he reports that he experienced one episode of emesis. He states that his dizziness and nausea are worsened when changing positions, prompting him to present to the emergency department. He reports a mild headache, mild chest pain, and neck stiffness as well. He states that he has not experienced any dizziness like this in the past. He denies any cough, fever, chills, numbness, weakness, syncope, abdominal pain, diarrhea, or any other symptoms. Of note, he reports that he finished a course of Prednisone for his allergies two weeks prior.    Review of Systems   Constitutional: Negative for chills and fever.   Respiratory: Negative for cough and shortness of breath.    Cardiovascular: Positive for chest pain.   Gastrointestinal: Positive for vomiting. Negative for abdominal pain and diarrhea.   Musculoskeletal: Positive for neck stiffness.   Neurological: Positive for dizziness. Negative for syncope, weakness, numbness and headaches.   All other systems reviewed and are negative.        Allergies:  The patient has no known allergies.     Medications:  Allegra    Past Medical History:    The patient denies any past medical history.      Family History:    Diabetes mellitus - father     Social History:  The patient states that he does drink alcohol but denies any drug use.    Physical Exam     Patient Vitals for the past 24 hrs:   BP Temp Temp src Pulse Resp SpO2 Height Weight   06/09/21 0231 -- 98.4  F (36.9  C) Oral -- -- -- -- --   06/09/21 0215 -- -- -- -- -- 100 % -- --   06/09/21 0200 124/68 -- -- 78 -- 99 % -- --   06/09/21 0101 125/78 -- -- 72 -- -- -- --   06/09/21 " "0100 125/78 -- -- 72 -- 98 % -- --   06/08/21 2338 136/86 -- Oral 73 16 99 % 1.702 m (5' 7\") 65.8 kg (145 lb)       Physical Exam  General: Patient is alert and normal appearing.  HEENT: Head atraumatic    Eyes: pupils equal and reactive. Conjunctiva clear   Nares: patent   Oropharynx: no lesions, uvula midline, no palatal draping, normal voice, no trismus  Neck: Supple without lymphadenopathy, no meningismus  Chest: Heart regular rate and rhythm.   Lungs: Equal clear to auscultation with no wheeze or rales  Abdomen: Soft, non tender, nondistended, normal bowel sounds  Back: No costovertebral angle tenderness, no midline C, T or L spine tenderness  Neuro: Grossly nonfocal, normal speech, strength equal bilaterally, CN 2-12 intact  Extremities: No deformities, equal radial and DP pulses. No clubbing, cyanosis.  No edema  Skin: Warm and dry with no rash.       Emergency Department Course   ECG  ECG taken at 0152, ECG read at 0200  Normal sinus rhythm with sinus arrhythmia. Normal ECG.  Rate 76 bpm. SC interval 150 ms. QRS duration 90 ms. QT/QTc 370/416 ms. P-R-T axes 68 53 63.     Laboratory:     CBC: WBC 7.5, HGB 14.4,     BMP: Glucose 135 (H), o/w WNL (Creatinine 0.97)    Troponin (Collected 2344): <0.015    CK total: 86      Emergency Department Course:    Reviewed:  I reviewed nursing notes, vitals, past medical history and care everywhere.    Assessments:  0145 I obtained history and examined the patient as noted above.     0243 I rechecked the patient and explained findings.     Interventions:  0152 NS, 1 L, IV  0152 Antivert 25 mg PO    Disposition:  The patient was discharged to home.       Impression & Plan       Medical Decision Making:  Patient is a 31-year-old male who presents the emergency department with dizziness after being out in the heat for quite some time today.  He also had some nausea associated with this.  It did get worse with position change.  No near syncope.  Stated some mild chest " pain when this started but that has resolved.  Patient's temperature was within normal limits on arrival.  Total CK is normal no evidence of rhabdomyolysis.  He received a dose of Antivert and IV fluids here with improvement of his symptoms.  He is up and ambulatory in the emergency department without difficulty.  He has no systemic signs of infection.  He has no reproducible abdominal tenderness to palpation.  Do not suspect pulmonary embolism, severe sepsis, intra-abdominal catastrophe.  Do not suspect dysrhythmia at this time.  No evidence of stroke on evaluation.  Symptomatically feels better with treatment here in the emergency department.  He is afebrile and hemodynamically stable.  He stated some neck stiffness but he has full range of motion of his neck and no headache and I do not feel this is consistent with meningitis.  Patient and significant other felt comfortable the plan for discharge.  Return precautions to the emergency department reviewed and all questions and concerns addressed.      Covid-19  Phuc Loo was evaluated during a global COVID-19 pandemic, which necessitated consideration that the patient might be at risk for infection with the SARS-CoV-2 virus that causes COVID-19.   Applicable protocols for evaluation were followed during the patient's care.     Diagnosis:    ICD-10-CM    1. Dehydration  E86.0    2. Heat effect, initial encounter  T67.9XXA        Scribe Disclosure:  I, Karthikeyan Smith, am serving as a scribe at 1:06 AM on 6/9/2021 to document services personally performed by Alycia Borjas MD based on my observations and the provider's statements to me.              Alycia Borjas MD  06/09/21 0424

## 2021-06-09 NOTE — ED TRIAGE NOTES
Pt reports dizziness and nausea with position changes. Pt was outside in heat today for work. Had one emesis.

## 2021-06-10 LAB — INTERPRETATION ECG - MUSE: NORMAL

## 2021-06-16 ENCOUNTER — MYC MEDICAL ADVICE (OUTPATIENT)
Dept: FAMILY MEDICINE | Facility: CLINIC | Age: 31
End: 2021-06-16

## 2021-06-16 ENCOUNTER — NURSE TRIAGE (OUTPATIENT)
Dept: FAMILY MEDICINE | Facility: CLINIC | Age: 31
End: 2021-06-16

## 2021-06-16 NOTE — TELEPHONE ENCOUNTER
"Left voice message asking pt to call triage back. . See my chart message below.     Routing to TRIAGE for more information on patient's sx.     \"I had to go to the ER last week because I had a severe dizziness and vertigo. I felt like I was going to pass out. I was given an IV fluid and was told that it probably was due to heat stress and dehydration. The vertigo effect did not go away for four days and I was really feeling disbalanced. The effect became worse especially when I made sudden movements and change positions. I believe that I might have some underlying cause or some kind of ENT related infection. I still feel disbalanced, irritated and fatigue. I wanted to consult before going to a specialist. Please advice if I need to set up an appointment for a thorough checkup.   Thank you\"   "

## 2021-06-16 NOTE — TELEPHONE ENCOUNTER
Left message asking pt to call triage back. Myc hanna message was also sent. Pt message was transfer to a phone call.

## 2021-06-16 NOTE — TELEPHONE ENCOUNTER
"Routing to TRIAGE for more information on patient's sx.    \"I had to go to the ER last week because I had a severe dizziness and vertigo. I felt like I was going to pass out. I was given an IV fluid and was told that it probably was due to heat stress and dehydration. The vertigo effect did not go away for four days and I was really feeling disbalanced. The effect became worse especially when I made sudden movements and change positions. I believe that I might have some underlying cause or some kind of ENT related infection. I still feel disbalanced, irritated and fatigue. I wanted to consult before going to a specialist. Please advice if I need to set up an appointment for a thorough checkup.   Thank you\"     Lisbet TA MA on 6/16/2021 at 11:18 AM      "

## 2021-06-16 NOTE — TELEPHONE ENCOUNTER
Reason for Disposition    [1] MILD dizziness (e.g., vertigo; walking normally) AND [2] has been evaluated by physician for this    Additional Information    Negative: [1] Weakness (i.e., paralysis, loss of muscle strength) of the face, arm or leg on one side of the body AND [2] sudden onset AND [3] present now    Negative: [1] Numbness (i.e., loss of sensation) of the face, arm or leg on one side of the body AND [2] sudden onset AND [3] present now    Negative: [1] Loss of speech or garbled speech AND [2] sudden onset AND [3] present now    Negative: Difficult to awaken or acting confused (e.g., disoriented, slurred speech)    Negative: Sounds like a life-threatening emergency to the triager    Negative: Followed a head injury    Negative: Followed an ear injury    Negative: Localized weakness or numbness is main symptom    Negative: Dizziness relates to riding in a car, going to an amusement park, etc.    Negative: [1] Dizziness is main symptom AND [2] NO spinning sensation (i.e., vertigo)    Negative: SEVERE dizziness (vertigo) (e.g., unable to walk without assistance)    Negative: [1] Dizziness (vertigo) present now AND [2] one or more stroke risk factors (i.e., hypertension, diabetes, prior stroke/TIA, heart attack)  (Exception: prior physician evaluation for this AND no different/worse than usual)    Negative: [1] Dizziness (vertigo) present now AND [2] age > 60  (Exception: prior physician evaluation for this AND no different/worse than usual)    Negative: Severe headache (e.g., excruciating)  (Exception: similar to previous migraines)    Negative: Patient sounds very sick or weak to the triager    Negative: Taking a medicine that could cause dizziness (e.g., phenytoin [Dilantin], carbamazepine [Tegretol], primidone [Mysoline])    Negative: [1] MODERATE dizziness (e.g., vertigo; feels very unsteady, interferes with normal activities) AND [2] has NOT been evaluated by physician for this    Negative:  "Earache    Negative: Vomiting occurs with dizziness    Negative: [1] MODERATE dizziness (e.g., vertigo; feels very unsteady, interferes with normal activities) AND [2] has been evaluated by physician for this    Negative: [1] MILD dizziness (e.g., vertigo; walking normally) AND [2] has NOT been evaluated by physician for this    Negative: [1] NO dizziness now AND [2] one or more stroke risk factors (i.e., hypertension, diabetes, prior stroke/TIA/heart attack)    Negative: [1] NO dizziness now AND [2] age > 60    Negative: Decreased hearing    Negative: Alcohol or drug abuse, known or suspected    Answer Assessment - Initial Assessment Questions  1. DESCRIPTION: \"Describe your dizziness.\"       Worse with sudden movements  2. VERTIGO: \"Do you feel like either you or the room is spinning or tilting?\"       Feels like he is unbalanced, irritable, and fatigued  3. LIGHTHEADED: \"Do you feel lightheaded?\" (e.g., somewhat faint, woozy, weak upon standing)      no  4. SEVERITY: \"How bad is it?\"  \"Can you walk?\"    - MILD - Feels unsteady but walking normally.    - MODERATE - Feels very unsteady when walking, but not falling; interferes with normal activities (e.g., school, work) .    - SEVERE - Unable to walk without falling (requires assistance).      mild  5. ONSET:  \"When did the dizziness begin?\"      Last week after being out in the heat and going to ED  6. AGGRAVATING FACTORS: \"Does anything make it worse?\" (e.g., standing, change in head position)      Change in position  7. CAUSE: \"What do you think is causing the dizziness?\"      Not sure but thinks it might be related to inner ear.   8. RECURRENT SYMPTOM: \"Have you had dizziness before?\" If so, ask: \"When was the last time?\" \"What happened that time?\"      no  9. OTHER SYMPTOMS: \"Do you have any other symptoms?\" (e.g., headache, weakness, numbness, vomiting, earache)      Discomfort in the left ear, stiff neck, and when speaks feels tired  10. PREGNANCY: \"Is there " "any chance you are pregnant?\" \"When was your last menstrual period?\"        NA    Protocols used: DIZZINESS - VERTIGO-A-AH    SEE PCP WITHIN 2 WEEKS:   * You need to be seen for this ongoing problem within the next 2 weeks. Call your doctor (or NP/PA) during regular office hours and make an appointment.   * IF PATIENT HAS NO PCP (PRIMARY CARE PROVIDER): A primary care clinic or an urgent care center are good places to go for care if you do not have a primary care provider. NOTE: Try to help caller find a PCP (e.g., use a physician referral line). Having a PCP or 'medical home' means better long-term care.      FALL PREVENTION:   * Sit at side of bed for several minutes before standing up. Stand up slowly.   * Avoid sudden movements of head.      CALL BACK IF:   * Vomiting or severe headache occurs   * Weakness develops in an arm or leg   * Unable to walk without falling   * Symptoms interfere with normal activities (e.g. work, school)   * You become worse.      Patient/Caregiver understands and will follow care advice? Yes, plans to follow advice     Lidia MURPHY RN  EP Triage    "

## 2021-06-24 ENCOUNTER — OFFICE VISIT (OUTPATIENT)
Dept: FAMILY MEDICINE | Facility: CLINIC | Age: 31
End: 2021-06-24
Payer: COMMERCIAL

## 2021-06-24 VITALS
WEIGHT: 150 LBS | TEMPERATURE: 96.8 F | SYSTOLIC BLOOD PRESSURE: 116 MMHG | OXYGEN SATURATION: 98 % | BODY MASS INDEX: 23.54 KG/M2 | DIASTOLIC BLOOD PRESSURE: 71 MMHG | HEIGHT: 67 IN | HEART RATE: 74 BPM

## 2021-06-24 DIAGNOSIS — T67.9XXS: ICD-10-CM

## 2021-06-24 DIAGNOSIS — J30.2 SEASONAL ALLERGIC RHINITIS, UNSPECIFIED TRIGGER: ICD-10-CM

## 2021-06-24 DIAGNOSIS — R42 VERTIGO: Primary | ICD-10-CM

## 2021-06-24 DIAGNOSIS — J32.9 CHRONIC SINUSITIS, UNSPECIFIED LOCATION: ICD-10-CM

## 2021-06-24 DIAGNOSIS — E86.0 DEHYDRATION: ICD-10-CM

## 2021-06-24 PROCEDURE — 99214 OFFICE O/P EST MOD 30 MIN: CPT | Performed by: INTERNAL MEDICINE

## 2021-06-24 RX ORDER — PREDNISONE 20 MG/1
20 TABLET ORAL 2 TIMES DAILY
Qty: 20 TABLET | Refills: 1 | Status: SHIPPED | OUTPATIENT
Start: 2021-06-24 | End: 2021-10-08

## 2021-06-24 ASSESSMENT — MIFFLIN-ST. JEOR: SCORE: 1584.03

## 2021-06-24 NOTE — PROGRESS NOTES
Subjective   Phuc is a 31 year old who presents for the following health issues     HPI     ED/UC Followup:    Facility:  [facility] Emergency Dept  Date of visit: 06/09/201   Reason for visit: Dehydration, Heat effect       Chief Complaint:     Post ER discharge follow up of multiple concerns including dehydration, heat effect, vertigo    HPI:   Patient Phuc Loo is a very pleasant 31 year old male with history of allergic rhinitis, bronchospasms who presents to Internal Medicine clinic today for post ER discharge follow up of multiple concerns including dehydration, heat effect, vertigo. Regarding the patient's poorly controlled vertigo, allergic rhinitis, sinusitis flare up symptoms, the patient requests ENT clinic referral for further evaluation going forward.  Regarding the patient's previous dehydration, heat effect, the patient's dehydratin symptoms have resolved with rehydration. No chest pain, fever or chills.         Current Medications:     Current Outpatient Medications   Medication Sig Dispense Refill     olopatadine (PATANOL) 0.1 % ophthalmic solution Place 1 drop into both eyes 2 times daily (Patient taking differently: Place 1 drop into both eyes 2 times daily as needed ) 5 mL 4     predniSONE (DELTASONE) 20 MG tablet Take 1 tablet (20 mg) by mouth 2 times daily 20 tablet 1         Allergies:      Allergies   Allergen Reactions     No Known Allergies             Past Medical History:     Past Medical History:   Diagnosis Date     Cough      Fatigue          Past Surgical History:   No past surgical history on file.      Family Medical History:     Family History   Problem Relation Age of Onset     Diabetes Father          Social History:     Social History     Socioeconomic History     Marital status:      Spouse name: Not on file     Number of children: Not on file     Years of education: Not on file     Highest education level: Not on file   Occupational History      Not on file   Social Needs     Financial resource strain: Not on file     Food insecurity     Worry: Not on file     Inability: Not on file     Transportation needs     Medical: Not on file     Non-medical: Not on file   Tobacco Use     Smoking status: Former Smoker     Quit date: 2021     Years since quittin.4     Smokeless tobacco: Never Used   Substance and Sexual Activity     Alcohol use: Yes     Comment: 1-2 drinks per month     Drug use: Not Currently     Sexual activity: Yes     Partners: Female   Lifestyle     Physical activity     Days per week: Not on file     Minutes per session: Not on file     Stress: Not on file   Relationships     Social connections     Talks on phone: Not on file     Gets together: Not on file     Attends [employer] service: Not on file     Active member of club or organization: Not on file     Attends meetings of clubs or organizations: Not on file     Relationship status: Not on file     Intimate partner violence     Fear of current or ex partner: Not on file     Emotionally abused: Not on file     Physically abused: Not on file     Forced sexual activity: Not on file   Other Topics Concern     Not on file   Social History Narrative     Not on file           Review of System:     Constitutional: Negative for fever or chills  Skin: Negative for rashes  Ears/Nose/Throat: positive for nasal congestion, sore throat, vertigo symptoms  Respiratory: No shortness of breath, dyspnea on exertion, cough, or hemoptysis  Cardiovascular: Negative for chest pain  Gastrointestinal: Negative for nausea, vomiting  Genitourinary: Negative for dysuria, hematuria  Musculoskeletal: Negative for myalgias  Neurologic: Negative for headaches  Psychiatric: Negative for depression, anxiety  Hematologic/Lymphatic/Immunologic: Negative  Endocrine: Negative  Behavioral: Negative for tobacco use       Physical Exam:   /71 (BP Location: Right arm, Patient Position: Sitting, Cuff Size: Adult Regular)   " Pulse 74   Temp 96.8  F (36  C) (Temporal)   Ht 1.702 m (5' 7\")   Wt 68 kg (150 lb)   SpO2 98%   BMI 23.49 kg/m      GENERAL: alert and no distress  EYES: eyes grossly normal to inspection, and conjunctivae and sclerae normal  HENT: Normocephalic atraumatic. Nose and mouth without ulcers or lesions, nasal congestion, chronic sinusitis symptoms present  NECK: supple  RESP: lungs clear to auscultation   CV: regular rate and rhythm, normal S1 S2  LYMPH: no peripheral edema   ABDOMEN: nondistended  MS: no gross musculoskeletal defects noted  SKIN: no suspicious lesions or rashes  NEURO: Alert & Oriented x 3.   PSYCH: mentation appears normal, affect normal        Diagnostic Test Results:     Diagnostic Test Results:  Labs reviewed in Epic    ASSESSMENT/PLAN:       (J30.2) Seasonal allergic rhinitis, unspecified trigger  (J32.9) Chronic sinusitis, unspecified location  (R42) Vertigo  (primary encounter diagnosis)  Comment: not well controlled  Plan: predniSONE (DELTASONE) 20 MG tablet,         OTOLARYNGOLOGY REFERRAL    (E86.0) Dehydration  (T67.9XXS) Heat effects, sequela  Comment: resolved  Plan: patient is advised to stay well hydrated during the summer heat.     Follow Up Plan:     Patient is instructed to return to Internal Medicine clinic for follow-up visit in 1 week.        Indu Mullen MD  Internal Medicine  [facility]    "

## 2021-06-25 ASSESSMENT — ASTHMA QUESTIONNAIRES: ACT_TOTALSCORE: 23

## 2021-10-08 ENCOUNTER — OFFICE VISIT (OUTPATIENT)
Dept: FAMILY MEDICINE | Facility: CLINIC | Age: 31
End: 2021-10-08
Payer: COMMERCIAL

## 2021-10-08 VITALS
WEIGHT: 149 LBS | BODY MASS INDEX: 23.39 KG/M2 | RESPIRATION RATE: 16 BRPM | DIASTOLIC BLOOD PRESSURE: 73 MMHG | HEIGHT: 67 IN | SYSTOLIC BLOOD PRESSURE: 127 MMHG | TEMPERATURE: 97.1 F | HEART RATE: 71 BPM | OXYGEN SATURATION: 97 %

## 2021-10-08 DIAGNOSIS — M79.10 MYALGIA: ICD-10-CM

## 2021-10-08 DIAGNOSIS — R42 VERTIGO: ICD-10-CM

## 2021-10-08 DIAGNOSIS — R09.81 NASAL CONGESTION: ICD-10-CM

## 2021-10-08 DIAGNOSIS — Z00.00 ENCOUNTER FOR PREVENTIVE CARE: Primary | ICD-10-CM

## 2021-10-08 DIAGNOSIS — L20.9 ATOPIC DERMATITIS, UNSPECIFIED TYPE: ICD-10-CM

## 2021-10-08 LAB
BASOPHILS # BLD AUTO: 0 10E3/UL (ref 0–0.2)
BASOPHILS NFR BLD AUTO: 0 %
EOSINOPHIL # BLD AUTO: 0.4 10E3/UL (ref 0–0.7)
EOSINOPHIL NFR BLD AUTO: 5 %
ERYTHROCYTE [DISTWIDTH] IN BLOOD BY AUTOMATED COUNT: 12.6 % (ref 10–15)
HBA1C MFR BLD: 5.5 % (ref 0–5.6)
HCT VFR BLD AUTO: 43.8 % (ref 40–53)
HGB BLD-MCNC: 15 G/DL (ref 13.3–17.7)
LYMPHOCYTES # BLD AUTO: 1.8 10E3/UL (ref 0.8–5.3)
LYMPHOCYTES NFR BLD AUTO: 27 %
MCH RBC QN AUTO: 30.7 PG (ref 26.5–33)
MCHC RBC AUTO-ENTMCNC: 34.2 G/DL (ref 31.5–36.5)
MCV RBC AUTO: 90 FL (ref 78–100)
MONOCYTES # BLD AUTO: 0.7 10E3/UL (ref 0–1.3)
MONOCYTES NFR BLD AUTO: 11 %
NEUTROPHILS # BLD AUTO: 3.9 10E3/UL (ref 1.6–8.3)
NEUTROPHILS NFR BLD AUTO: 57 %
PLATELET # BLD AUTO: 197 10E3/UL (ref 150–450)
RBC # BLD AUTO: 4.89 10E6/UL (ref 4.4–5.9)
WBC # BLD AUTO: 6.9 10E3/UL (ref 4–11)

## 2021-10-08 PROCEDURE — 94070 EVALUATION OF WHEEZING: CPT | Performed by: INTERNAL MEDICINE

## 2021-10-08 PROCEDURE — 86003 ALLG SPEC IGE CRUDE XTRC EA: CPT | Performed by: INTERNAL MEDICINE

## 2021-10-08 PROCEDURE — 82785 ASSAY OF IGE: CPT | Performed by: INTERNAL MEDICINE

## 2021-10-08 PROCEDURE — 99395 PREV VISIT EST AGE 18-39: CPT | Performed by: INTERNAL MEDICINE

## 2021-10-08 PROCEDURE — 36415 COLL VENOUS BLD VENIPUNCTURE: CPT | Performed by: INTERNAL MEDICINE

## 2021-10-08 PROCEDURE — 83036 HEMOGLOBIN GLYCOSYLATED A1C: CPT | Performed by: INTERNAL MEDICINE

## 2021-10-08 PROCEDURE — 80050 GENERAL HEALTH PANEL: CPT | Performed by: INTERNAL MEDICINE

## 2021-10-08 PROCEDURE — 99213 OFFICE O/P EST LOW 20 MIN: CPT | Mod: 25 | Performed by: INTERNAL MEDICINE

## 2021-10-08 ASSESSMENT — MIFFLIN-ST. JEOR: SCORE: 1579.49

## 2021-10-08 NOTE — PROGRESS NOTES
SUBJECTIVE:   CC: Phuc Loo is an 31 year old male who presents for preventative health visit.  Patient's only significant symptom is that of chronic congestion sensation of allergies.  States he has had this for years.    His sister has a history of asthma.  He has a history of a pruritic skin condition which she had when he was younger.  The patient states that his symptoms of nasal congestion are persistent throughout the year.  He states that it does not matter if he goes on vacation or not his symptoms tend to be fairly significant.    Occasionally he has a symptoms where he feels that breathing is a little bit more difficult.  Taking a deep breath can be difficult at times.  Denies wheezing.  No recent rashes otherwise.    No endocrine hematologic or allergic symptoms.  Family history is positive for diabetes with his father.      Patient has been advised of split billing requirements and indicates understanding: Yes  Healthy Habits:    Getting at least 3 servings of Calcium per day:  Yes    Bi-annual eye exam:  NO    Dental care twice a year:  NO    Sleep apnea or symptoms of sleep apnea:  None    Diet:  Regular (no restrictions)    Frequency of exercise:  2-3 days/week    Duration of exercise:  30-45 minutes    Taking medications regularly:  Not Applicable    Barriers to taking medications:  Not applicable    Medication side effects:  Not applicable    PHQ-2 Total Score:    Additional concerns today:  No              Today's PHQ-2 Score:   PHQ-2 ( 1999 Pfizer) 6/24/2021   Q1: Little interest or pleasure in doing things 0   Q2: Feeling down, depressed or hopeless 0   PHQ-2 Score 0       Abuse: Current or Past(Physical, Sexual or Emotional)- No  Do you feel safe in your environment? Yes    Have you ever done Advance Care Planning? (For example, a Health Directive, POLST, or a discussion with a medical provider or your loved ones about your wishes): No, advance care planning information given to patient to  review.  Patient declined advance care planning discussion at this time.    Social History     Tobacco Use     Smoking status: Former Smoker     Quit date: 2021     Years since quittin.7     Smokeless tobacco: Never Used   Substance Use Topics     Alcohol use: Yes     Comment: 1-2 drinks per month     If you drink alcohol do you typically have >3 drinks per day or >7 drinks per week? No    No flowsheet data found.    Last PSA: No results found for: PSA    Reviewed orders with patient. Reviewed health maintenance and updated orders accordingly - Yes  Lab work is in process    Reviewed and updated as needed this visit by clinical staff                 Reviewed and updated as needed this visit by Provider                Past Medical History:   Diagnosis Date     Cough      Fatigue         Review of Systems  CONSTITUTIONAL: NEGATIVE for fever, chills, change in weight  INTEGUMENTARY/SKIN: NEGATIVE for worrisome rashes, moles or lesions  EYES: NEGATIVE for vision changes or irritation  ENT: NEGATIVE for ear, mouth and throat problems  RESP: NEGATIVE for significant cough or SOB  CV: NEGATIVE for chest pain, palpitations or peripheral edema  GI: NEGATIVE for nausea, abdominal pain, heartburn, or change in bowel habits   male: negative for dysuria, hematuria, decreased urinary stream, erectile dysfunction, urethral discharge  MUSCULOSKELETAL: NEGATIVE for significant arthralgias or myalgia  NEURO: NEGATIVE for weakness, dizziness or paresthesias  PSYCHIATRIC: NEGATIVE for changes in mood or affect    OBJECTIVE:   There were no vitals taken for this visit.    Physical Exam  GENERAL: healthy, alert and no distress  EYES: Eyes grossly normal to inspection, PERRL and conjunctivae and sclerae normal  HENT: ear canals and TM's normal, nose and mouth without ulcers or lesions  NECK: no adenopathy, no asymmetry, masses, or scars and thyroid normal to palpation  RESP: lungs clear to auscultation - no rales, rhonchi or  "wheezes  CV: regular rate and rhythm, normal S1 S2, no S3 or S4, no murmur, click or rub, no peripheral edema and peripheral pulses strong  ABDOMEN: soft, nontender, no hepatosplenomegaly, no masses and bowel sounds normal  MS: no gross musculoskeletal defects noted, no edema  SKIN: no suspicious lesions or rashes  NEURO: Normal strength and tone, mentation intact and speech normal  PSYCH: mentation appears normal, affect normal/bright    Diagnostic Test Results:  Labs reviewed in Epic    ASSESSMENT/PLAN:       ICD-10-CM    1. Encounter for preventive care  Z00.00 Comprehensive metabolic panel (BMP + Alb, Alk Phos, ALT, AST, Total. Bili, TP)     CBC with platelets and differential     TSH with free T4 reflex     Hemoglobin A1c     Comprehensive metabolic panel (BMP + Alb, Alk Phos, ALT, AST, Total. Bili, TP)     CBC with platelets and differential     TSH with free T4 reflex     Hemoglobin A1c   2. Myalgia  M79.10    3. Nasal congestion  R09.81 IgE     Allergen D farinae IgE     BRONCHIAL CHALLENGE SPIROMETRY     IgE     Allergen D farinae IgE   4. Vertigo  R42 Allergen D farinae IgE     Allergen D farinae IgE   5. Atopic dermatitis, unspecified type  L20.9 BRONCHIAL CHALLENGE SPIROMETRY   Patient had white dermatographia some I suspect he has an element of atopic dermatitis and possible mild intermittent asthma.  I would recommend a bronchial challenge/spirometry.    Nasal congestion is consistent with perennial allergies dust mite/IgE will be assessed    Patient has been advised of split billing requirements and indicates understanding: Yes  COUNSELING:   Reviewed preventive health counseling, as reflected in patient instructions    Estimated body mass index is 23.49 kg/m  as calculated from the following:    Height as of 6/24/21: 1.702 m (5' 7\").    Weight as of 6/24/21: 68 kg (150 lb).         He reports that he quit smoking about 9 months ago. He has never used smokeless tobacco.      Counseling Resources:  ATP " IV Guidelines  Pooled Cohorts Equation Calculator  FRAX Risk Assessment  ICSI Preventive Guidelines  Dietary Guidelines for Americans, 2010  USDA's MyPlate  ASA Prophylaxis  Lung CA Screening    Ceferino Gu MD  [facility]

## 2021-10-10 ENCOUNTER — HEALTH MAINTENANCE LETTER (OUTPATIENT)
Age: 31
End: 2021-10-10

## 2021-10-10 LAB
ALBUMIN SERPL-MCNC: 3.8 G/DL (ref 3.4–5)
ALP SERPL-CCNC: 60 U/L (ref 40–150)
ALT SERPL W P-5'-P-CCNC: 64 U/L (ref 0–70)
ANION GAP SERPL CALCULATED.3IONS-SCNC: 2 MMOL/L (ref 3–14)
AST SERPL W P-5'-P-CCNC: 26 U/L (ref 0–45)
BILIRUB SERPL-MCNC: 1 MG/DL (ref 0.2–1.3)
BUN SERPL-MCNC: 14 MG/DL (ref 7–30)
CALCIUM SERPL-MCNC: 9 MG/DL (ref 8.5–10.1)
CHLORIDE BLD-SCNC: 105 MMOL/L (ref 94–109)
CO2 SERPL-SCNC: 30 MMOL/L (ref 20–32)
CREAT SERPL-MCNC: 0.95 MG/DL (ref 0.66–1.25)
GFR SERPL CREATININE-BSD FRML MDRD: >90 ML/MIN/1.73M2
GLUCOSE BLD-MCNC: 100 MG/DL (ref 70–99)
POTASSIUM BLD-SCNC: 4.4 MMOL/L (ref 3.4–5.3)
PROT SERPL-MCNC: 7.7 G/DL (ref 6.8–8.8)
SODIUM SERPL-SCNC: 137 MMOL/L (ref 133–144)
TSH SERPL DL<=0.005 MIU/L-ACNC: 1.83 MU/L (ref 0.4–4)

## 2021-10-11 LAB
D FARINAE IGE QN: 2.18 KU(A)/L
IGE SERPL-ACNC: 196 KU/L (ref 0–114)

## 2022-05-26 ENCOUNTER — MYC MEDICAL ADVICE (OUTPATIENT)
Dept: FAMILY MEDICINE | Facility: CLINIC | Age: 32
End: 2022-05-26
Payer: COMMERCIAL

## 2022-05-27 ENCOUNTER — NURSE TRIAGE (OUTPATIENT)
Dept: FAMILY MEDICINE | Facility: CLINIC | Age: 32
End: 2022-05-27

## 2022-05-27 NOTE — TELEPHONE ENCOUNTER
Pt was called following my chart message below. Complains of a intermittent productive. Denies chest pain, fever, wheezing or SOB. Pt has found relief with prednisone in the past. Triage advised he see urgent care. Pt declines. No further action needed unless PCP has other recommendations.     Holly Doctor,  I am getting my allergies back and currently having severe cough. Is there a way you can prescribe prednisone for me?  Thank you    Reason for Disposition    SEVERE coughing spells (e.g., whooping sound after coughing, vomiting after coughing)     Pt complains of severe interment cough    Additional Information    Negative: Severe difficulty breathing (e.g., struggling for each breath, speaks in single words)    Negative: [1] Lips or face are bluish now AND [2] persists when not coughing    Negative: Sounds like a life-threatening emergency to the triager    Negative: Chest pain is main symptom    Negative: [1] Dry (non-productive) cough AND [2] < 3 weeks duration     (i.e., no sputum or minimal clear sputum)    Negative: [1] Wet (productive) cough AND [2] < 3 weeks duration     (i.e., white-yellow, yellow, green, or ashley colored sputum)    Negative: [1] Previous asthma attacks AND [2] this feels like asthma attack    Negative: Difficulty breathing  (Exception: no change from usual, chronic shortness of breath)    Negative: [1] Increasing difficulty breathing AND [2] always has some difficulty breathing    Negative: Patient sounds very sick or weak to the triager    Negative: [1] Coughed up blood AND [2] > 1 tablespoon (15 ml) (Exception: blood-tinged sputum)    Negative: Fever > 103 F (39.4 C)    Negative: [1] Fever > 101 F (38.3 C) AND [2] age > 60    Negative: [1] Fever > 100.0 F (37.8 C) AND [2] bedridden (e.g., nursing home patient, CVA, chronic illness, recovering from surgery)    Negative: [1] Fever > 100.0 F (37.8 C) AND [2] diabetes mellitus or weak immune system (e.g., HIV positive, cancer chemo,  splenectomy, organ transplant, chronic steroids)    Protocols used: COUGH - CHRONIC-A-AH

## 2022-09-18 ENCOUNTER — HEALTH MAINTENANCE LETTER (OUTPATIENT)
Age: 32
End: 2022-09-18

## 2023-01-29 ENCOUNTER — HEALTH MAINTENANCE LETTER (OUTPATIENT)
Age: 33
End: 2023-01-29

## 2023-05-17 ENCOUNTER — OFFICE VISIT (OUTPATIENT)
Dept: FAMILY MEDICINE | Facility: CLINIC | Age: 33
End: 2023-05-17
Payer: COMMERCIAL

## 2023-05-17 VITALS
BODY MASS INDEX: 24.01 KG/M2 | HEIGHT: 67 IN | HEART RATE: 84 BPM | OXYGEN SATURATION: 95 % | DIASTOLIC BLOOD PRESSURE: 80 MMHG | WEIGHT: 153 LBS | SYSTOLIC BLOOD PRESSURE: 120 MMHG | TEMPERATURE: 99 F

## 2023-05-17 DIAGNOSIS — J30.2 SEASONAL ALLERGIES: Primary | ICD-10-CM

## 2023-05-17 PROCEDURE — 99213 OFFICE O/P EST LOW 20 MIN: CPT | Performed by: FAMILY MEDICINE

## 2023-05-17 RX ORDER — PREDNISONE 20 MG/1
40 TABLET ORAL DAILY
Qty: 10 TABLET | Refills: 0 | Status: SHIPPED | OUTPATIENT
Start: 2023-05-17 | End: 2023-05-22

## 2023-05-17 ASSESSMENT — ASTHMA QUESTIONNAIRES
QUESTION_1 LAST FOUR WEEKS HOW MUCH OF THE TIME DID YOUR ASTHMA KEEP YOU FROM GETTING AS MUCH DONE AT WORK, SCHOOL OR AT HOME: NONE OF THE TIME
QUESTION_2 LAST FOUR WEEKS HOW OFTEN HAVE YOU HAD SHORTNESS OF BREATH: ONCE A DAY
QUESTION_4 LAST FOUR WEEKS HOW OFTEN HAVE YOU USED YOUR RESCUE INHALER OR NEBULIZER MEDICATION (SUCH AS ALBUTEROL): NOT AT ALL
ACT_TOTALSCORE: 16
ACT_TOTALSCORE: 16
QUESTION_3 LAST FOUR WEEKS HOW OFTEN DID YOUR ASTHMA SYMPTOMS (WHEEZING, COUGHING, SHORTNESS OF BREATH, CHEST TIGHTNESS OR PAIN) WAKE YOU UP AT NIGHT OR EARLIER THAN USUAL IN THE MORNING: FOUR OR MORE NIGHTS A WEEK
QUESTION_5 LAST FOUR WEEKS HOW WOULD YOU RATE YOUR ASTHMA CONTROL: SOMEWHAT CONTROLLED

## 2023-05-17 NOTE — PROGRESS NOTES
"  Assessment & Plan     (J30.2) Seasonal allergies  (primary encounter diagnosis)  Comment: pt has seasonal allergies, usually has symptoms at early summer. He has cough, nose discharge and congestion, watery eye and itching. No wheezing, fever, sore throat. No sick contact. He takes otc allegra and nose spray. Non smoker. Two years ago he took oral prednisone and helped with symptoms. Exam is not remarkable. No environment change and no new pets. Likely he has seasonal allergies.   Plan: predniSONE (DELTASONE) 20 MG tablet        Advise rest and push water. Continue otc allegra and nose spray. Will take oral prednisone.         See Patient Instructions    Tomasa Cifuentes MD  [facility] OWEN Friend is a 33 year old, presenting for the following health issues:  Sinus Problem (Sinus pressure/ pain, congestion, runny nose, cough- seems to happen every spring -)        5/17/2023     3:17 PM   Additional Questions   Roomed by Gen RADHA CMA   Accompanied by spouse     Sinus Problem     History of Present Illness       Reason for visit:  Sinus allergy    He eats 2-3 servings of fruits and vegetables daily.He consumes 2 sweetened beverage(s) daily.He exercises with enough effort to increase his heart rate 20 to 29 minutes per day.  He exercises with enough effort to increase his heart rate 4 days per week.   He is taking medications regularly.               Review of Systems   Constitutional, HEENT, cardiovascular, pulmonary, gi and gu systems are negative, except as otherwise noted.      Objective    /80   Pulse 84   Temp 99  F (37.2  C) (Oral)   Ht 1.702 m (5' 7\")   Wt 69.4 kg (153 lb)   SpO2 95%   BMI 23.96 kg/m    Body mass index is 23.96 kg/m .  Physical Exam   GENERAL: healthy, alert and no distress  EYES: Eyes grossly normal to inspection, PERRL and conjunctivae and sclerae normal  HENT: ear canals and TM's normal, nose and mouth without ulcers or lesions. Sinus non " tenderness.   NECK: no adenopathy, no asymmetry, masses, or scars and thyroid normal to palpation  RESP: lungs clear to auscultation - no rales, rhonchi or wheezes  CV: regular rate and rhythm, normal S1 S2, no S3 or S4, no murmur, click or rub, no peripheral edema and peripheral pulses strong

## 2024-02-25 ENCOUNTER — HEALTH MAINTENANCE LETTER (OUTPATIENT)
Age: 34
End: 2024-02-25

## 2024-11-03 SDOH — HEALTH STABILITY: PHYSICAL HEALTH: ON AVERAGE, HOW MANY MINUTES DO YOU ENGAGE IN EXERCISE AT THIS LEVEL?: 30 MIN

## 2024-11-03 SDOH — HEALTH STABILITY: PHYSICAL HEALTH: ON AVERAGE, HOW MANY DAYS PER WEEK DO YOU ENGAGE IN MODERATE TO STRENUOUS EXERCISE (LIKE A BRISK WALK)?: 4 DAYS

## 2024-11-03 ASSESSMENT — SOCIAL DETERMINANTS OF HEALTH (SDOH): HOW OFTEN DO YOU GET TOGETHER WITH FRIENDS OR RELATIVES?: ONCE A WEEK

## 2024-11-04 ENCOUNTER — OFFICE VISIT (OUTPATIENT)
Dept: INTERNAL MEDICINE | Facility: CLINIC | Age: 34
End: 2024-11-04
Payer: COMMERCIAL

## 2024-11-04 VITALS
DIASTOLIC BLOOD PRESSURE: 78 MMHG | HEART RATE: 74 BPM | RESPIRATION RATE: 16 BRPM | WEIGHT: 151.5 LBS | OXYGEN SATURATION: 97 % | SYSTOLIC BLOOD PRESSURE: 106 MMHG | HEIGHT: 67 IN | BODY MASS INDEX: 23.78 KG/M2 | TEMPERATURE: 98 F

## 2024-11-04 DIAGNOSIS — Z23 NEED FOR INFLUENZA VACCINATION: ICD-10-CM

## 2024-11-04 DIAGNOSIS — Z00.00 ENCOUNTER FOR ROUTINE ADULT HEALTH EXAMINATION WITHOUT ABNORMAL FINDINGS: Primary | ICD-10-CM

## 2024-11-04 DIAGNOSIS — Z23 NEED FOR COVID-19 VACCINE: ICD-10-CM

## 2024-11-04 DIAGNOSIS — Z13.6 CARDIOVASCULAR SCREENING; LDL GOAL LESS THAN 130: ICD-10-CM

## 2024-11-04 LAB
ANION GAP SERPL CALCULATED.3IONS-SCNC: 9 MMOL/L (ref 7–15)
BUN SERPL-MCNC: 14.7 MG/DL (ref 6–20)
CALCIUM SERPL-MCNC: 9.6 MG/DL (ref 8.8–10.4)
CHLORIDE SERPL-SCNC: 102 MMOL/L (ref 98–107)
CHOLEST SERPL-MCNC: 233 MG/DL
CREAT SERPL-MCNC: 1.02 MG/DL (ref 0.67–1.17)
EGFRCR SERPLBLD CKD-EPI 2021: >90 ML/MIN/1.73M2
ERYTHROCYTE [DISTWIDTH] IN BLOOD BY AUTOMATED COUNT: 12.6 % (ref 10–15)
FASTING STATUS PATIENT QL REPORTED: YES
FASTING STATUS PATIENT QL REPORTED: YES
GLUCOSE SERPL-MCNC: 87 MG/DL (ref 70–99)
HCO3 SERPL-SCNC: 28 MMOL/L (ref 22–29)
HCT VFR BLD AUTO: 46.1 % (ref 40–53)
HDLC SERPL-MCNC: 51 MG/DL
HGB BLD-MCNC: 14.9 G/DL (ref 13.3–17.7)
LDLC SERPL CALC-MCNC: 163 MG/DL
MCH RBC QN AUTO: 29 PG (ref 26.5–33)
MCHC RBC AUTO-ENTMCNC: 32.3 G/DL (ref 31.5–36.5)
MCV RBC AUTO: 90 FL (ref 78–100)
NONHDLC SERPL-MCNC: 182 MG/DL
PLATELET # BLD AUTO: 208 10E3/UL (ref 150–450)
POTASSIUM SERPL-SCNC: 4.1 MMOL/L (ref 3.4–5.3)
RBC # BLD AUTO: 5.14 10E6/UL (ref 4.4–5.9)
SODIUM SERPL-SCNC: 139 MMOL/L (ref 135–145)
TRIGL SERPL-MCNC: 95 MG/DL
WBC # BLD AUTO: 10.1 10E3/UL (ref 4–11)

## 2024-11-04 PROCEDURE — 90480 ADMN SARSCOV2 VAC 1/ONLY CMP: CPT | Performed by: INTERNAL MEDICINE

## 2024-11-04 PROCEDURE — 36415 COLL VENOUS BLD VENIPUNCTURE: CPT | Performed by: INTERNAL MEDICINE

## 2024-11-04 PROCEDURE — 80048 BASIC METABOLIC PNL TOTAL CA: CPT | Performed by: INTERNAL MEDICINE

## 2024-11-04 PROCEDURE — 99395 PREV VISIT EST AGE 18-39: CPT | Mod: 25 | Performed by: INTERNAL MEDICINE

## 2024-11-04 PROCEDURE — 80061 LIPID PANEL: CPT | Performed by: INTERNAL MEDICINE

## 2024-11-04 PROCEDURE — 90471 IMMUNIZATION ADMIN: CPT | Performed by: INTERNAL MEDICINE

## 2024-11-04 PROCEDURE — 85027 COMPLETE CBC AUTOMATED: CPT | Performed by: INTERNAL MEDICINE

## 2024-11-04 PROCEDURE — 91320 SARSCV2 VAC 30MCG TRS-SUC IM: CPT | Performed by: INTERNAL MEDICINE

## 2024-11-04 PROCEDURE — 90656 IIV3 VACC NO PRSV 0.5 ML IM: CPT | Performed by: INTERNAL MEDICINE

## 2024-11-04 ASSESSMENT — PAIN SCALES - GENERAL: PAINLEVEL_OUTOF10: NO PAIN (0)

## 2024-11-04 NOTE — PATIENT INSTRUCTIONS
"   Annual influenza vaccine (flu shot) given today.  Get this every fall.      Most current Covid vaccine given today.  Make sure stay current with the Covid vaccine and get an updated Covid vaccine as each new version becomes available.      Return to see us for another annual physical in 3 years, schedule a follow up visit sooner if needed for anything else.  Use [employer] or Call [phone] to schedule the appointment with me.       5 GOALS TO PREVENT VASCULAR DISEASE:     1.  Aggressive blood pressure control, under 130/80 ideally.  Using medications if needed.    Your blood pressure is under good control    BP Readings from Last 4 Encounters:   11/04/24 106/78   05/17/23 120/80   10/08/21 127/73   06/24/21 116/71       2.  Aggressive LDL cholesterol (\"bad cholesterol\") lowering as indicated.    Your goal is an LDL under 130 for sure, preferably under 100.  (If you have diabetes or previous vascular disease, the the LDL goals would be under 100 for sure, preferably under 70.)    New guidelines identify four high-risk groups who could benefit from statins:   *people with pre-existing heart disease, such as those who have had a heart attack;   *people ages 40 to 75 who have diabetes of any type  *patients ages 40 to 75 with at least a 7.5% risk of developing cardiovascular disease over the next decade, according to a formula described in the guidelines  *patients with the sort of super-high cholesterol that sometimes runs in families, as evidenced by an LDL of 190 milligrams per deciliter or higher    We are checking your cholesterol levesl today    --LDL (\"bad\" cholesterol): normal under 130, ideal under 100.  Best way to lower this is through better food choices ( lower fats, etc.), medication may be considered if indicated  --HDL (\"good\") cholesterol: (normal above 40 for men, above 50 for women).  Best way to improve the HDL level is through regular physical exercise.  There are no medications to raise HDL " "levels.   --Triglycerides (desirable under 150): triglycerides are more about metabolic issues, best way to improve this is through better diet choices, emphasizing reducing the intake of \"simple carbohydrates\" (e.g. White bread, white rice, pasta, noodles, potatoes, snack foods, regular soda, juices (except fresh squeezed), cakes, cookies, candy, etc.) as best possible. Medications may be considered for triglyceride levels routinely above 400.    3.  Aggressive diabetic prevention, screening and/or management.      You do not have diabetes as of the most recent blood tests.     4.  No smoking    5.  Consider daily preventative aspirin over age 50 if you have enough cardiac risk factors to place you at higher risk for the presence of vascular disease.    If you have any reason not to take aspirin such easy bruising or bleeding, stomach problems, other anticoagulant medications, or any other side effects, then you should not take Aspirin.     --Based on your current cardiac disease risk profile and/or age over 75, you do NOT need to take daily preventative aspirin.          Preventive Health Recommendations  Male Ages 26 - 45    Yearly exam:             See your health care provider every year in order to:  Review health changes in your medical history or your family medical history  Review and reassess any ongoing chronic medical conditions  Discuss preventive care.    Identify and aggressively manage any vascular disease risk factors (including blood pressure, cholesterol, diabetes)  Review and renew any prescription medications if you take prescription medications.  Consider STD testing if you are at risk.  Cholesterol testing starting at age 25. If you are at risk for heart disease, have your cholesterol tested at least every 5 years.   Diabetes screening every 3 years, if you are at risk for diabetes, then diabetic screening should be done annually.  Colon cancer screening normally begins at age 45, but starting " "at age 35 if you have a family history of colon cancer below the age of 50.    Shots:   Get the annual influenza vaccine (flu shot) each fall.   Get a tetanus shot every 10 years.   Covid vaccines are now recommended annually.  Get the most updated Covid vaccine when it becomes available, consider getting this at the same time as the annual influenza vaccine.    Nutrition:  Eat at least 5 servings of fruits and vegetables daily.   Eat whole-grain bread, whole-wheat pasta and brown rice instead of white grains and rice.   Talk to your provider about Calcium and Vitamin D.        --Good Grains:  Oats, brown rice, Quinoa (these do not raise the blood sugar as much)     --Bad grains:  Anything made from wheat or white rice     (because these raise the blood sugars significantly, and the possible gluten issue from wheat for some people).      --Proteins:  Aim for \"lean proteins\" including chicken, fish, seafood, pork, turkey, and eggs (in moderation); Eat red meat only occasionally        Lifestyle  Exercise for at least 150 minutes a week (30 minutes a day, 5 days a week). This will help you control your weight and prevent disease.   Limit alcohol to one drink per day.   Always use condoms for STD prevention, until you are in a committed monogamous relationship  No smoking.   Wear sunscreen to prevent skin cancer.   See your dentist every six months for an exam and cleaning.           "

## 2024-11-04 NOTE — PROGRESS NOTES
Preventive Care Visit  [facility]  Tye Vaughan MD, Internal Medicine  Nov 4, 2024      Assessment & Plan     (Z00.00) Encounter for routine adult health examination without abnormal findings  (primary encounter diagnosis)  Comment: Discussed cardiac disease risk factor modification including screening, preventing, and treating hypertension, elevated lipids, diabetes, and smoking cessation.    Discussed age appropriate cancer screening recommendations as dictated by age group and past medical history.    Recommended making better food choices as often as possible, including lower carb, lower fat, lower salt diet and moderation in any alcohol intake.    Recommended maintaining regular physical activity/exercise throughout their lifetime.  Recommended safety and injury prevention (i.e. seatbelt use, safety equipment like helmets when biking, etc).    Reviewed preventive health counseling, as reflected in patient instructions       Regular exercise       Healthy diet/nutrition       Vision screening       Hearing screening       Immunizations  Vaccinated for: Covid-19 and Influenza        Colorectal cancer screening starting age 45, unless a family history of colon cancer develops       Prostate cancer screening starting age 45  Plan: Lipid panel reflex to direct LDL Fasting, CBC         with platelets, Basic metabolic panel            (Z13.6) CARDIOVASCULAR SCREENING; LDL GOAL LESS THAN 130  Comment: Discussed cardiac disease risk factors and cardiac disease risk factor modification.   Plan: Lipid panel reflex to direct LDL Fasting, CBC         with platelets, Basic metabolic panel            (Z23) Need for influenza vaccination  Comment:   Plan: INFLUENZA VACCINE,SPLIT         VIRUS,TRIVALENT,PF(FLUZONE)            (Z23) Need for COVID-19 vaccine  Comment:   Plan: COVID-19 12+ (PFIZER)               Patient has been advised of split billing requirements and indicates understanding:  Yes        Counseling  Appropriate preventive services were addressed with this patient via screening, questionnaire, or discussion as appropriate for fall prevention, nutrition, physical activity, Tobacco-use cessation, social engagement, weight loss and cognition.  Checklist reviewing preventive services available has been given to the patient.  Reviewed patient's diet, addressing concerns and/or questions.   The patient was instructed to see the dentist every 6 months.           Debi Friend is a 34 year old, presenting for the following:  Physical (Fasting for blood work)        11/4/2024     3:01 PM   Additional Questions   Roomed by Kristyn LANDRY CMA          hospitals        Health Care Directive  Patient does not have a Health Care Directive:       11/3/2024   General Health   How would you rate your overall physical health? Good   Feel stress (tense, anxious, or unable to sleep) Not at all            11/3/2024   Nutrition   Three or more servings of calcium each day? Yes   Diet: Regular (no restrictions)   How many servings of fruit and vegetables per day? (!) 0-1   How many sweetened beverages each day? 0-1            11/3/2024   Exercise   Days per week of moderate/strenous exercise 4 days   Average minutes spent exercising at this level 30 min            11/3/2024   Social Factors   Frequency of gathering with friends or relatives Once a week   Worry food won't last until get money to buy more No   Food not last or not have enough money for food? No   Do you have housing? (Housing is defined as stable permanent housing and does not include staying ouside in a car, in a tent, in an abandoned building, in an overnight shelter, or couch-surfing.) Yes   Are you worried about losing your housing? No   Lack of transportation? No   Unable to get utilities (heat,electricity)? No            11/3/2024   Dental   Dentist two times every year? (!) NO            11/3/2024   TB Screening   Were you born outside of the  [place]? Yes            Today's PHQ-2 Score:       11/3/2024    10:22 PM   PHQ-2 ( 1999 Pfizer)   Q1: Little interest or pleasure in doing things 0    Q2: Feeling down, depressed or hopeless 0    PHQ-2 Score 0    Q1: Little interest or pleasure in doing things Not at all   Q2: Feeling down, depressed or hopeless Not at all   PHQ-2 Score 0       Patient-reported           11/3/2024   Substance Use   Alcohol more than 3/day or more than 7/wk No   Do you use any other substances recreationally? No        Social History     Tobacco Use    Smoking status: Former     Current packs/day: 0.00     Types: Cigarettes     Quit date: 1/1/2021     Years since quitting: 3.8    Smokeless tobacco: Never   Substance Use Topics    Alcohol use: Yes     Comment: 1-2 drinks per month    Drug use: Not Currently             11/3/2024   One time HIV Screening   Previous HIV test? No          11/3/2024   STI Screening   New sexual partner(s) since last STI/HIV test? No            11/3/2024   Contraception/Family Planning   Questions about contraception or family planning No           Reviewed and updated as needed this visit by Provider   Tobacco   Meds   Med Hx  Surg Hx  Fam Hx              **I reviewed the information recorded in the patient's EPIC chart (including but not limited to medical history, surgical history, family history, problem list, medication list, and allergy list) and updated the information as indicated based on the patients reported information.         Review of Systems  Constitutional, HEENT, cardiovascular, pulmonary, GI, , musculoskeletal, neuro, skin, endocrine and psych systems are negative, except as otherwise noted.     Objective    Exam  /78   Pulse 74   Temp 98  F (36.7  C) (Oral)   Resp 16   Ht 1.702 m (5' 7\")   Wt 68.7 kg (151 lb 8 oz)   SpO2 97%   BMI 23.73 kg/m     Estimated body mass index is 23.73 kg/m  as calculated from the following:    Height as of this encounter: 1.702 m (5' 7\").    " Weight as of this encounter: 68.7 kg (151 lb 8 oz).    Physical Exam  GENERAL alert and no distress  EYES:  Normal sclera,conjunctiva, EOMI  HENT: oral and posterior pharynx without lesions or erythema, facies symmetric  NECK: Neck supple. No LAD, without thyroidmegaly.  RESP: Clear to ausculation bilaterally without wheezes or crackles. Normal BS in all fields.  CV: RRR normal S1S2 without murmurs, rubs or gallops.  LYMPH: no cervical lymph adenopathy appreciated  MS: extremities- no gross deformities of the visible extremities noted,   EXT:  no lower extremity edema  PSYCH: Alert and oriented times 3; speech- coherent  SKIN:  No obvious significant skin lesions on visible portions of face         Signed Electronically by: Tye Vaughan MD

## 2025-04-02 ENCOUNTER — HOSPITAL ENCOUNTER (EMERGENCY)
Facility: CLINIC | Age: 35
Discharge: HOME | End: 2025-04-02
Attending: EMERGENCY MEDICINE | Admitting: EMERGENCY MEDICINE
Payer: COMMERCIAL

## 2025-04-02 VITALS
WEIGHT: 155 LBS | DIASTOLIC BLOOD PRESSURE: 81 MMHG | RESPIRATION RATE: 16 BRPM | OXYGEN SATURATION: 97 % | HEIGHT: 67 IN | TEMPERATURE: 97.2 F | BODY MASS INDEX: 24.33 KG/M2 | HEART RATE: 74 BPM | SYSTOLIC BLOOD PRESSURE: 137 MMHG

## 2025-04-02 DIAGNOSIS — R19.7 DIARRHEA, UNSPECIFIED TYPE: ICD-10-CM

## 2025-04-02 LAB
ATRIAL RATE - MUSE: 77 BPM
DIASTOLIC BLOOD PRESSURE - MUSE: NORMAL MMHG
INTERPRETATION ECG - MUSE: NORMAL
P AXIS - MUSE: 67 DEGREES
PR INTERVAL - MUSE: 132 MS
QRS DURATION - MUSE: 84 MS
QT - MUSE: 380 MS
QTC - MUSE: 430 MS
R AXIS - MUSE: 17 DEGREES
SYSTOLIC BLOOD PRESSURE - MUSE: NORMAL MMHG
T AXIS - MUSE: 61 DEGREES
VENTRICULAR RATE- MUSE: 77 BPM

## 2025-04-02 PROCEDURE — 250N000011 HC RX IP 250 OP 636: Performed by: EMERGENCY MEDICINE

## 2025-04-02 PROCEDURE — 258N000003 HC RX IP 258 OP 636: Performed by: EMERGENCY MEDICINE

## 2025-04-02 PROCEDURE — 99284 EMERGENCY DEPT VISIT MOD MDM: CPT | Mod: 25

## 2025-04-02 PROCEDURE — 93005 ELECTROCARDIOGRAM TRACING: CPT

## 2025-04-02 RX ORDER — ONDANSETRON 4 MG/1
4 TABLET, ORALLY DISINTEGRATING ORAL EVERY 6 HOURS PRN
Qty: 8 TABLET | Refills: 0 | Status: SHIPPED | OUTPATIENT
Start: 2025-04-02 | End: 2025-04-09

## 2025-04-02 RX ORDER — ONDANSETRON 4 MG/1
4 TABLET, ORALLY DISINTEGRATING ORAL EVERY 6 HOURS PRN
Qty: 8 TABLET | Refills: 0 | Status: SHIPPED | OUTPATIENT
Start: 2025-04-02 | End: 2025-04-02

## 2025-04-02 RX ORDER — ONDANSETRON 2 MG/ML
4 INJECTION INTRAMUSCULAR; INTRAVENOUS EVERY 30 MIN PRN
Status: DISCONTINUED | OUTPATIENT
Start: 2025-04-02 | End: 2025-04-02 | Stop reason: HOSPADM

## 2025-04-02 RX ADMIN — SODIUM CHLORIDE 1000 ML: 9 INJECTION, SOLUTION INTRAVENOUS at 10:28

## 2025-04-02 RX ADMIN — ONDANSETRON 4 MG: 2 INJECTION, SOLUTION INTRAMUSCULAR; INTRAVENOUS at 10:27

## 2025-04-02 ASSESSMENT — COLUMBIA-SUICIDE SEVERITY RATING SCALE - C-SSRS
1. IN THE PAST MONTH, HAVE YOU WISHED YOU WERE DEAD OR WISHED YOU COULD GO TO SLEEP AND NOT WAKE UP?: NO
6. HAVE YOU EVER DONE ANYTHING, STARTED TO DO ANYTHING, OR PREPARED TO DO ANYTHING TO END YOUR LIFE?: NO
2. HAVE YOU ACTUALLY HAD ANY THOUGHTS OF KILLING YOURSELF IN THE PAST MONTH?: NO

## 2025-04-02 ASSESSMENT — ACTIVITIES OF DAILY LIVING (ADL): ADLS_ACUITY_SCORE: 45

## 2025-04-02 NOTE — ED PROVIDER NOTES
"  Emergency Department Note      History of Present Illness     Chief Complaint   Diarrhea      HPI   Phuc Loo is a 35 year old male here for evaluation of diarrhea. Patient reports onset of diarrhea yesterday. He had 2-3 episodes. He also reports onset of nausea and a small amount of nausea. His stomach feels tight. He also thinks he felt palpitations. Patient reports having difficulty sleeping. He recently went out of state and drank alcohol. He had liquid IV at home.    Independent Historian   None    Review of External Notes   None    Past Medical History     Medical History and Problem List   The patient does not have any pertinent past medical history.    Medications   The patient is not on any regular prescribed medications.    Physical Exam     Patient Vitals for the past 24 hrs:   BP Temp Temp src Pulse Resp SpO2 Height Weight   04/02/25 0958 137/81 97.2  F (36.2  C) Temporal 74 16 97 % 1.702 m (5' 7\") 70.3 kg (155 lb)     Physical Exam  Nursing note and vitals reviewed.    Constitutional:  Appears comfortable.    HENT:                Nose normal.  No discharge.      Oral mucosa is moist.  Eyes:    Conjunctivae are normal without injection.  Pupils are equal.  Cardiovascular:  Normal rate, regular rhythm with normal S1 and S2.      Normal heart sounds and peripheral pulses 2+ and equal.    Pulmonary:  Effort normal and breath sounds clear to auscultation bilaterally.    GI:    Soft. No distension and no mass. No tenderness.    Musculoskeletal:  Normal range of motion. No extremity deformity.     No edema and no tenderness.    Neurological:   Alert and oriented. No focal weakness.  Skin:    Skin is warm and dry. No rash noted.   Psychiatric:   Behavior is normal. Appropriate mood and affect.     Judgment and thought content normal.      Diagnostics     Lab Results   Labs Ordered and Resulted from Time of ED Arrival to Time of ED Departure - No data to display    Imaging   No orders to display       EKG "   ECG taken at 1004, ECG read at 1017  Normal sinus rhythm  Normal ECG   Rate 77 bpm. TN interval 132 ms. QRS duration 84 ms. QT/QTc 380/430 ms. P-R-T axes 67 17 61.    Independent Interpretation   None    ED Course      Medications Administered   Medications   ondansetron (ZOFRAN) injection 4 mg (4 mg Intravenous $Given 4/2/25 1027)   sodium chloride 0.9% BOLUS 1,000 mL (0 mLs Intravenous Stopped 4/2/25 1147)       Procedures   Procedures     Discussion of Management   None    ED Course   ED Course as of 04/02/25 1154   Wed Apr 02, 2025   1017 I obtained history and examined the patient as noted above.   1146 We discussed plan for discharge and the patient is comfortable with this.       Additional Documentation  None    Medical Decision Making / Diagnosis     CMS Diagnoses: None    MIPS       None    MDM   Phuc Loo is a 35 year old male resents with some nausea but mostly 2 episodes of diarrhea and stomach cramping.  He thinks it was due to some he ate last night.  He was given a liter of fluids, Zofran IV and was feeling better but still had some cramping.  He did not have focal tenderness and no fever so I did not get any blood work.  I believe this was probably foodborne diarrhea.  Reassurance was given he will be discharged home with a prescription for Zofran and Imodium as needed for diarrhea.  If he gets worse he is welcome to return to the ER.    Push fluids and Gatorade, Zofran as needed for nausea, if you have further diarrhea take some over-the-counter Imodium as needed.  Follow-up with your doctor if any further problems.    Disposition   The patient was discharged.     Diagnosis     ICD-10-CM    1. Diarrhea, unspecified type  R19.7     Possibly foodborne           Discharge Medications   Discharge Medication List as of 4/2/2025 11:47 AM            Scribe Disclosure:  I, Jocelyne Godinez, am serving as a scribe at 10:15 AM on 4/2/2025 to document services personally performed by Mary Pineda MD  based on my observations and the provider's statements to me.        Mary Pineda MD  04/02/25 1566

## 2025-04-02 NOTE — DISCHARGE INSTRUCTIONS
Push fluids and Gatorade, Zofran as needed for nausea, if you have further diarrhea take some over-the-counter Imodium as needed.  Follow-up with your doctor if any further problems.

## 2025-04-02 NOTE — ED TRIAGE NOTES
Pt c/o diarrhea since yesterday and thinks he felt palpitations     Triage Assessment (Adult)       Row Name 04/02/25 1003          Triage Assessment    Airway WDL WDL        Respiratory WDL    Respiratory WDL WDL        Skin Circulation/Temperature WDL    Skin Circulation/Temperature WDL WDL        Cardiac WDL    Cardiac WDL WDL        Peripheral/Neurovascular WDL    Peripheral Neurovascular WDL WDL        Cognitive/Neuro/Behavioral WDL    Cognitive/Neuro/Behavioral WDL WDL

## 2025-04-03 ENCOUNTER — PATIENT OUTREACH (OUTPATIENT)
Dept: FAMILY MEDICINE | Facility: CLINIC | Age: 35
End: 2025-04-03
Payer: COMMERCIAL

## 2025-04-04 ENCOUNTER — HOSPITAL ENCOUNTER (EMERGENCY)
Facility: CLINIC | Age: 35
Discharge: HOME | End: 2025-04-04
Attending: EMERGENCY MEDICINE | Admitting: EMERGENCY MEDICINE
Payer: COMMERCIAL

## 2025-04-04 VITALS
HEIGHT: 67 IN | RESPIRATION RATE: 16 BRPM | BODY MASS INDEX: 24.33 KG/M2 | OXYGEN SATURATION: 97 % | DIASTOLIC BLOOD PRESSURE: 85 MMHG | SYSTOLIC BLOOD PRESSURE: 122 MMHG | TEMPERATURE: 97.6 F | WEIGHT: 155 LBS | HEART RATE: 78 BPM

## 2025-04-04 DIAGNOSIS — K52.9 GASTROENTERITIS: ICD-10-CM

## 2025-04-04 LAB
ALBUMIN SERPL BCG-MCNC: 4.4 G/DL (ref 3.5–5.2)
ALP SERPL-CCNC: 89 U/L (ref 40–150)
ALT SERPL W P-5'-P-CCNC: 89 U/L (ref 0–70)
ANION GAP SERPL CALCULATED.3IONS-SCNC: 13 MMOL/L (ref 7–15)
AST SERPL W P-5'-P-CCNC: 43 U/L (ref 0–45)
ATRIAL RATE - MUSE: 74 BPM
BASOPHILS # BLD AUTO: 0 10E3/UL (ref 0–0.2)
BASOPHILS NFR BLD AUTO: 0 %
BILIRUB SERPL-MCNC: 1.4 MG/DL
BUN SERPL-MCNC: 12.3 MG/DL (ref 6–20)
CALCIUM SERPL-MCNC: 9.6 MG/DL (ref 8.8–10.4)
CHLORIDE SERPL-SCNC: 98 MMOL/L (ref 98–107)
CREAT SERPL-MCNC: 1.01 MG/DL (ref 0.67–1.17)
DIASTOLIC BLOOD PRESSURE - MUSE: NORMAL MMHG
EGFRCR SERPLBLD CKD-EPI 2021: >90 ML/MIN/1.73M2
EOSINOPHIL # BLD AUTO: 0 10E3/UL (ref 0–0.7)
EOSINOPHIL NFR BLD AUTO: 0 %
ERYTHROCYTE [DISTWIDTH] IN BLOOD BY AUTOMATED COUNT: 12.2 % (ref 10–15)
GLUCOSE SERPL-MCNC: 142 MG/DL (ref 70–99)
HCO3 SERPL-SCNC: 26 MMOL/L (ref 22–29)
HCT VFR BLD AUTO: 46.4 % (ref 40–53)
HGB BLD-MCNC: 15.4 G/DL (ref 13.3–17.7)
IMM GRANULOCYTES # BLD: 0 10E3/UL
IMM GRANULOCYTES NFR BLD: 0 %
INTERPRETATION ECG - MUSE: NORMAL
LIPASE SERPL-CCNC: 30 U/L (ref 13–60)
LYMPHOCYTES # BLD AUTO: 1.2 10E3/UL (ref 0.8–5.3)
LYMPHOCYTES NFR BLD AUTO: 15 %
MAGNESIUM SERPL-MCNC: 2.3 MG/DL (ref 1.7–2.3)
MCH RBC QN AUTO: 28.9 PG (ref 26.5–33)
MCHC RBC AUTO-ENTMCNC: 33.2 G/DL (ref 31.5–36.5)
MCV RBC AUTO: 87 FL (ref 78–100)
MONOCYTES # BLD AUTO: 0.5 10E3/UL (ref 0–1.3)
MONOCYTES NFR BLD AUTO: 6 %
NEUTROPHILS # BLD AUTO: 6.5 10E3/UL (ref 1.6–8.3)
NEUTROPHILS NFR BLD AUTO: 79 %
NRBC # BLD AUTO: 0 10E3/UL
NRBC BLD AUTO-RTO: 0 /100
P AXIS - MUSE: 80 DEGREES
PLATELET # BLD AUTO: 211 10E3/UL (ref 150–450)
POTASSIUM SERPL-SCNC: 3.8 MMOL/L (ref 3.4–5.3)
PR INTERVAL - MUSE: 162 MS
PROT SERPL-MCNC: 7.9 G/DL (ref 6.4–8.3)
QRS DURATION - MUSE: 86 MS
QT - MUSE: 378 MS
QTC - MUSE: 419 MS
R AXIS - MUSE: 49 DEGREES
RBC # BLD AUTO: 5.33 10E6/UL (ref 4.4–5.9)
SODIUM SERPL-SCNC: 137 MMOL/L (ref 135–145)
SYSTOLIC BLOOD PRESSURE - MUSE: NORMAL MMHG
T AXIS - MUSE: 52 DEGREES
VENTRICULAR RATE- MUSE: 74 BPM
WBC # BLD AUTO: 8.3 10E3/UL (ref 4–11)

## 2025-04-04 PROCEDURE — 85025 COMPLETE CBC W/AUTO DIFF WBC: CPT | Performed by: EMERGENCY MEDICINE

## 2025-04-04 PROCEDURE — 93005 ELECTROCARDIOGRAM TRACING: CPT

## 2025-04-04 PROCEDURE — 36415 COLL VENOUS BLD VENIPUNCTURE: CPT | Performed by: EMERGENCY MEDICINE

## 2025-04-04 PROCEDURE — 83735 ASSAY OF MAGNESIUM: CPT | Performed by: EMERGENCY MEDICINE

## 2025-04-04 PROCEDURE — 82565 ASSAY OF CREATININE: CPT | Performed by: EMERGENCY MEDICINE

## 2025-04-04 PROCEDURE — 96360 HYDRATION IV INFUSION INIT: CPT

## 2025-04-04 PROCEDURE — 83690 ASSAY OF LIPASE: CPT | Performed by: EMERGENCY MEDICINE

## 2025-04-04 PROCEDURE — 258N000003 HC RX IP 258 OP 636: Performed by: EMERGENCY MEDICINE

## 2025-04-04 PROCEDURE — 99284 EMERGENCY DEPT VISIT MOD MDM: CPT

## 2025-04-04 RX ORDER — ONDANSETRON 2 MG/ML
4 INJECTION INTRAMUSCULAR; INTRAVENOUS EVERY 30 MIN PRN
Status: DISCONTINUED | OUTPATIENT
Start: 2025-04-04 | End: 2025-04-04 | Stop reason: HOSPADM

## 2025-04-04 RX ADMIN — SODIUM CHLORIDE 1000 ML: 9 INJECTION, SOLUTION INTRAVENOUS at 08:15

## 2025-04-04 ASSESSMENT — ACTIVITIES OF DAILY LIVING (ADL)
ADLS_ACUITY_SCORE: 41
ADLS_ACUITY_SCORE: 41

## 2025-04-04 NOTE — ED TRIAGE NOTES
Patient states he was here recently for food poisoning. He describes generalized weakness, and feeling like he might pass out especially after eating, has been having palpitations at night causing him difficulty sleeping.  States he made himself vomit yesterday, and felt better following this. Last drank alcohol on Monday. Denies diarrhea.     Triage Assessment (Adult)       Row Name 04/04/25 0800          Triage Assessment    Airway WDL WDL        Respiratory WDL    Respiratory WDL WDL        Skin Circulation/Temperature WDL    Skin Circulation/Temperature WDL WDL        Cardiac WDL    Cardiac WDL X  palpitations at night causing him difficulty sleeping.        Peripheral/Neurovascular WDL    Peripheral Neurovascular WDL WDL        Cognitive/Neuro/Behavioral WDL    Cognitive/Neuro/Behavioral WDL WDL

## 2025-04-04 NOTE — DISCHARGE INSTRUCTIONS
Take over the counter Pepto-Bismol according to the package instructions to help decrease stomach and intestinal cramping. You may also use tylenol and ibuprofen as needed.

## 2025-04-07 ENCOUNTER — PATIENT OUTREACH (OUTPATIENT)
Dept: INTERNAL MEDICINE | Facility: CLINIC | Age: 35
End: 2025-04-07
Payer: COMMERCIAL

## 2025-04-08 NOTE — TELEPHONE ENCOUNTER
ED / Discharge Outreach Protocol    Patient Contact    Attempt # 1    Was call answered?  No.  Left message on voicemail with information to call clinic back.    Vashti Greene RN

## 2025-04-09 NOTE — TELEPHONE ENCOUNTER
No need for follow up appt if he is definitely better    I removed the Ondansetron from the med list.     Close encounter if nothing else needed

## 2025-04-09 NOTE — TELEPHONE ENCOUNTER
Pt refusing follow up appointment stating his symptoms have resolved and he is feeling better.     Reviewed med list with pt.     Routing request to provider: please advise if ok to discontinue medication Zofran from pt's med list per his request?    Please route provider response to care team to update med list as needed.       Transitions of Care Outreach  Chief Complaint   Patient presents with    Hospital F/U       Most Recent Admission Date: 4/4/2025   Most Recent Admission Diagnosis:      Most Recent Discharge Date: 4/4/2025   Most Recent Discharge Diagnosis: Gastroenteritis - K52.9     Transitions of Care Assessment    Discharge Assessment  How are you doing now that you are home?: lots better  How are your symptoms? (Red Flag symptoms escalate to triage hotline per guidelines): Improved  Do you know how to contact your clinic care team if you have future questions or changes to your health status? : Yes  Does the patient have their discharge instructions? : Yes  Does the patient have questions regarding their discharge instructions? : No  Were you started on any new medications or were there changes to any of your previous medications? : No  Does the patient have all of their medications?: Yes  Do you have questions regarding any of your medications? : No  Do you have all of your needed medical supplies or equipment (DME)?  (i.e. oxygen tank, CPAP, cane, etc.): Yes    Follow up Plan     Discharge Follow-Up  Discharge follow up appointment scheduled in alignment with recommended follow up timeframe or Transitions of Risk Category? (Low = within 30 days; Moderate= within 14 days; High= within 7 days): No  Patient's follow up appointment not scheduled: Patient declined scheduling support. Education on the importance of transitions of care follow up. Provided scheduling phone number.    No future appointments.    Outpatient Plan as outlined on AVS reviewed with patient.    For any urgent concerns, please contact  our 24 hour nurse triage line: [phone] ([phone])       Lida Hall RN

## 2025-04-28 ENCOUNTER — OFFICE VISIT (OUTPATIENT)
Dept: INTERNAL MEDICINE | Facility: CLINIC | Age: 35
End: 2025-04-28
Payer: COMMERCIAL

## 2025-04-28 VITALS
HEIGHT: 67 IN | HEART RATE: 69 BPM | DIASTOLIC BLOOD PRESSURE: 74 MMHG | OXYGEN SATURATION: 97 % | TEMPERATURE: 97.3 F | BODY MASS INDEX: 23.65 KG/M2 | WEIGHT: 150.7 LBS | SYSTOLIC BLOOD PRESSURE: 114 MMHG

## 2025-04-28 DIAGNOSIS — J30.2 SEASONAL ALLERGIC RHINITIS, UNSPECIFIED TRIGGER: ICD-10-CM

## 2025-04-28 DIAGNOSIS — Z86.19 HISTORY OF VIRAL GASTROENTERITIS: Primary | ICD-10-CM

## 2025-04-28 PROCEDURE — 99213 OFFICE O/P EST LOW 20 MIN: CPT | Performed by: INTERNAL MEDICINE

## 2025-04-28 ASSESSMENT — PAIN SCALES - GENERAL: PAINLEVEL_OUTOF10: NO PAIN (0)

## 2025-04-28 NOTE — PATIENT INSTRUCTIONS
"      SEASONAL ALLERGIES:     *  Take one of the following over the counter non-sedating anti-histamines allergy tablet once per day, every day for the next 4-8 weeks during the duration of the allergy season.      --generic Allegra (fexofenidine)  OR  --generic Claritin (loratidine)     OR  --generic Zyrtec (cetirizine)      *  IF YOU HAVE A LOT OF EYE IRRITATION FROM ALLERGIES:Use allergy eye drops IF NEEDED for allergic conjunctivitis     --Patanol (or similar over the counter product):  1 drop into the affected eye twice per day as needed during the main allergy season(s)     --if this prescription eye drop is not covered by your insurance, then have the pharmacist direct you to a similar over the counter version.          *  IF YOU HAVE A LOT OF NASAL OR SINUS CONGESTION:  Use steroid nasal spray (available over the counter), Use 2 sprays into each nostril once per day, every day regardless of how you feel for the next 4-8 weeks, depending on the length of the allergy season.  This type of steroid nasal spray will take at least 10 days to reach full effect, please use it for at least 3 full weeks before deciding if it doesn't work for you.       --typical brands include Flonase (fluticasone) or Nasonex (mometasone) or Nasocort (triamcinolone)    Examples of steroid nasal spray:  (cheapest prices are from [employer] or [employer])             * Beware of decongestants or medications preparations that have a \"D\", these contain pseudoephedrine or phenylephrine which may raise your blood pressure. If your blood pressure is well controlled and you are not on multiple medications, then you may be able to take small amounts of decongestant without a large chance of side effects, but please monitor your blood pressure and if >140/90 while on the decongestants, then stop those products.     *  If you cannot tolerate decongestants or have been told not to take decongestants, you can use chlortrimeton (chlorpheniramine) if " you react poorly to decongestants.  Always try and use the lowest dose needed.  If you have a low of overnight or early morning allergy symptoms, then try taking you favorite nighttime multi symptom cold reliever medication (such as Nyquil, Vicks Formula 44, etc.)

## 2025-04-28 NOTE — PROGRESS NOTES
"  Assessment & Plan     (Z86.19) History of viral gastroenteritis  (primary encounter diagnosis)  Comment: recent episodes og GI distress were most consistent with viral gastroenteritis (\"stomach flu\") which has resolved.   No further workup required.   His instenes may remain slightly more \"touchy\" and sensitive to fattier meals over the next few weeks.   Advance diet as tolerated.   Plan:     (J30.2) Seasonal allergic rhinitis, unspecified trigger  Comment: Reviewed seasonal allergies/ environmental allergies/allergic and/or chronic rhintis with the pt. and available treatment options.  Pt understands that the insurance companies have lately desiring to see trial and failure of claritin OTC before covering prescription.  Also disucssed the role of steroid nasal sprays in these types of situations. Discussed the concept of avoidance measures and taking an active role in modifying whatever can be changed in preventing exposures to knwon allergens.  Also discussed the imprtance of reconsdiering ets if they are part of the problem. Also discussed the absolute need for smoking cessation if any tobacco abuse present.   Plan:            MED REC REQUIRED  Post Medication Reconciliation Status:  Discharge medications reconciled, continue medications without change        Subjective   Phuc is a 35 year old, presenting for the following health issues:  Hospital F/U    HPI        ED/UC Followup:    Facility:  [facility] ER  Date of visit: 4-2-25 and 4-4-25  Reason for visit: diarrhea and gastroenteritis with generalized weakness  Current Status: diarrhea and abdominal pain resolved-  occasional getting palpitations, not in ER-then feels weak-g  Possible dehydration-having dry sinus -previous vertigo-irritation in left ear   Trying to figure out why this is happening    Since home from hospital, they report that they are feeling better.   Energy level and stamina are slowly improving.    Appetite and intake are improving. " "  No fevers, no chills  No shortness of breath.   No abdominal pain.   They have returned to prior routine and activities.     2.)  reports seasonl allerguies, typically worse in spring and fall.   Having regular clear rhinorhea.   No shortness of breath, ,no wheezing.     **I reviewed the information recorded in the patient's EPIC chart (including but not limited to medical history, surgical history, family history, problem list, medication list, and allergy list) and updated the information as indicated based on the patients reported information.         Review of Systems  Constitutional, HEENT, cardiovascular, pulmonary, gi and gu systems are negative, except as otherwise noted.      Objective    /74   Pulse 69   Temp 97.3  F (36.3  C) (Temporal)   Ht 1.702 m (5' 7\")   Wt 68.4 kg (150 lb 11.2 oz)   SpO2 97%   BMI 23.60 kg/m    Body mass index is 23.6 kg/m .  Physical Exam   Physical Exam  Vitals and nursing note reviewed.   Constitutional:       Comments: Moves normally, alert, no apparent distress  HENT:      Head: Normocephalic and atraumatic.      Nose: Nose normal.   Eyes:      Extraocular Movements: Extraocular movements intact.   Cardiovascular:      Rate and Rhythm: Normal rate and regular rhythm.      Heart sounds: Normal heart sounds.   Pulmonary:      Effort: Pulmonary effort is normal.      Breath sounds: Normal breath sounds.   Abdominal:      General: There is no distension.      Palpations: There is no mass.      Tenderness: No abdominal tenderness, no distention.     Bowel sounds: normal  Musculoskeletal:         General: Normal range of motion, moves into the room normal, moves in and out of chair normally.    Skin:     General: Skin is warm and dry.   Neurological:      General: No focal deficit present.      Mental Status: Alert, responds to questions, Speech coherent  Psychiatric:         Mood and Affect: Mood normal.              Signed Electronically by: Tye Vaughan, " MD

## 2025-06-03 DIAGNOSIS — R06.00 DYSPNEA, UNSPECIFIED TYPE: Primary | ICD-10-CM

## 2025-07-17 ENCOUNTER — OFFICE VISIT (OUTPATIENT)
Dept: PULMONOLOGY | Facility: CLINIC | Age: 35
End: 2025-07-17
Payer: COMMERCIAL

## 2025-07-17 VITALS
SYSTOLIC BLOOD PRESSURE: 115 MMHG | BODY MASS INDEX: 24.99 KG/M2 | OXYGEN SATURATION: 95 % | HEART RATE: 74 BPM | DIASTOLIC BLOOD PRESSURE: 73 MMHG | HEIGHT: 67 IN | WEIGHT: 159.2 LBS

## 2025-07-17 DIAGNOSIS — R06.00 DYSPNEA, UNSPECIFIED TYPE: ICD-10-CM

## 2025-07-17 DIAGNOSIS — J45.30 MILD PERSISTENT ASTHMA WITHOUT COMPLICATION: Primary | ICD-10-CM

## 2025-07-17 DIAGNOSIS — J30.2 SEASONAL ALLERGIC RHINITIS, UNSPECIFIED TRIGGER: ICD-10-CM

## 2025-07-17 LAB
DLCOCOR-%PRED-PRE: 91 %
DLCOCOR-PRE: 27.68 ML/MIN/MMHG
DLCOUNC-%PRED-PRE: 96 %
DLCOUNC-PRE: 28.06 ML/MIN/MMHG
DLCOUNC-PRED: 29.13 ML/MIN/MMHG
ERV-%PRED-PRE: 92 %
ERV-PRE: 1.31 L
ERV-PRED: 1.42 L
EXPTIME-PRE: 15.31 SEC
FEF2575-%PRED-PRE: 51 %
FEF2575-PRE: 1.92 L/SEC
FEF2575-PRED: 3.74 L/SEC
FEFMAX-%PRED-PRE: 84 %
FEFMAX-PRE: 8.14 L/SEC
FEFMAX-PRED: 9.6 L/SEC
FEV1-%PRED-PRE: 90 %
FEV1-PRE: 3.3 L
FEV1FEV6-PRE: 71 %
FEV1FEV6-PRED: 82 %
FEV1FVC-PRE: 65 %
FEV1FVC-PRED: 82 %
FEV1SVC-PRE: 74 L
FEV1SVC-PRED: 72 L
FIFMAX-PRE: 3.57 L/SEC
FRCPLETH-%PRED-PRE: 107 %
FRCPLETH-PRE: 3.19 L
FRCPLETH-PRED: 2.96 L
FVC-%PRED-PRE: 114 %
FVC-PRE: 5.06 L
FVC-PRED: 4.44 L
GAW-PRED: 1.03 L/S/CMH2O
HGB BLD-MCNC: 16.1 G/DL
IC-%PRED-PRE: 89 %
IC-PRE: 3.17 L
IC-PRED: 3.54 L
Lab: 79 %
RVPLETH-%PRED-PRE: 130 %
RVPLETH-PRE: 1.88 L
RVPLETH-PRED: 1.44 L
SGAW-PRED: 0.2 1/CMH2O*S
SRAW-PRED: < 4.76 CMH2O*S
TLCPLETH-%PRED-PRE: 97 %
TLCPLETH-PRE: 6.36 L
TLCPLETH-PRED: 6.52 L
VA-%PRED-PRE: 100 %
VA-PRE: 6.05 L
VC-%PRED-PRE: 87 %
VC-PRE: 4.48 L
VC-PRED: 5.09 L

## 2025-07-17 RX ORDER — MOMETASONE FUROATE MONOHYDRATE 50 UG/1
2 SPRAY, METERED NASAL DAILY
COMMUNITY

## 2025-07-17 RX ORDER — BUDESONIDE AND FORMOTEROL FUMARATE DIHYDRATE 80; 4.5 UG/1; UG/1
2 AEROSOL RESPIRATORY (INHALATION) 2 TIMES DAILY
Qty: 10.2 G | Refills: 11 | Status: SHIPPED | OUTPATIENT
Start: 2025-07-17

## 2025-07-17 RX ORDER — FEXOFENADINE HCL 180 MG/1
180 TABLET ORAL DAILY
Qty: 30 TABLET | Refills: 11 | Status: SHIPPED | OUTPATIENT
Start: 2025-07-17

## 2025-07-17 NOTE — PROGRESS NOTES
Pulmonary Outpatient Consult Note  July 17, 2025      Assessment and Plan:   Phuc Loo is a 35 year old M, former smoker, with history of allergic rhinitis presenting today for evaluation of dyspnea.   Reports history of intermittent cough, chest tightness, and shortness of breath. Chart review shows multiple office visits for bronchitis. Seems to have seasonal flares in symptoms. Has never tried any inhaled medications. Prednisone has been helpful in the past.     PFTs showed mild obstruction (FEV1 90%), he refused bronchodilators during testing.  Lung volumes are normal.  Diffusing capacity uncorrected for hemoglobin was normal.  FeNO was elevated at 30 ppb.     #. Asthma, mild: Symptom history, spirometry, history of frequent bronchitis, and seasonal flares consistent with asthma.  Likely allergic trigger.  Cannot rule out chronic postnasal drip as trigger for cough.  START Symbicort (80/4.5), 2 puffs twice daily.  Rinse and gargle after use.  Consider albuterol trial for short acting relief in the future.  He was hesitant to use this today during testing due to possible side effects.  If symptoms persist at follow-up will obtain CT chest.  #. Chronic sinus drainage, allergic rhinitis: Admits to seasonal allergies.  Will occasionally take Allegra and Nasonex nasal spray with some relief.  He also has some chronic sinus congestion with frequent throat clearing and has a hard time sleeping due to nasal congestion.   ENT referral placed  Continue OTC antihistamine and intranasal steroid  IgE, [facility] allergy panel, and CBC with differential.  We will go over results at follow-up.  #.  HCM: UTD with COVID-vaccine (11/2024), Tdap (2014), seasonal flu  Recommend he stay up-to-date with respiratory vaccines.  I would suggest updated Tdap, seasonal flu, and updated COVID booster this fall.    If his symptoms exacerbate: Prednisone 40 mg daily x 5 days.    RTC 6 weeks for recheck    Xochitl Marquez CNP  Pulmonary  "Medicine  ______________________________________________________________________________    CC:   Chief Complaint   Patient presents with    Consult     R06.00 (ICD-10-CM) - Dyspnea, unspecified type; Referred by Tye Vaughan MD; MR-Epic; no imaging     HPI:   Phuc presents for evaluation of dyspnea and cough.  He is accompanied by his wife.  For years has been having having intermittent SOB and cough. Chart review shows previous office visits for allergy triggered cough and bronchitis.   Reports feeling like \"something is going on with respiratory system\".   Feels he is not getting enough oxygen. He is very concerned and fixated with this problem.  Also reporting chest tightness and congestion. This is the same year round.   Does not wake feeling rested. The left side of his nose is always blocked.   Denies wheeze. Will occasionally hear \"crack\" with a deep breath.   The cough is dry.   He is trying to exercise more regularly and thinks this has been helpful.   Had improvement in his cough symptoms with prednisone at prior clinic visits.     Was evaluated by ENT in [facility]. He reports being told he had a deviated septum.   Has seasonal allergies. Takes Allegra and Nasonex for this, slightly helpful.   Using air purifying at home.   Very minimal smoking history, quit in 2021.  There was a lot of air pollution in [facility]. Has to wear a mask when there.     FH- mother has sinus disease and sister has asthma.     PMH:  There are no active problems to display for this patient.    PSH:  Past Surgical History:   Procedure Laterality Date    NO HISTORY OF SURGERY       Current Meds:  No current outpatient medications on file.     No current facility-administered medications for this visit.     Allergies:  Allergies   Allergen Reactions    Dust Mites      Chest pain, dry throat     No Known Allergies     Other Environmental Allergy      Pollen      Social Hx:  Marital status: lives with wife   Number of children: 1 " "on the way   Resides in a apartment, no concern for mold.  Occupational history: , mostly office work    service: none   Pets: dog   Smoking history: 1 pack year history    Family HX: family history includes Asthma in his sister; Diabetes Type 2  (age of onset: 45) in his father; No Known Problems in his brother and mother.    ROS:   10-point review performed and notable for the above mentioned symptoms. The remainder reviewed and negative.     Physical Exam:  /73 (BP Location: Left arm, Patient Position: Sitting, Cuff Size: Adult Large)   Pulse 74   Ht 1.707 m (5' 7.2\")   Wt 72.2 kg (159 lb 3.2 oz)   SpO2 95%   BMI 24.79 kg/m      Physical Exam  Constitutional:       General: He is not in acute distress.     Appearance: He is not ill-appearing.   Cardiovascular:      Rate and Rhythm: Normal rate and regular rhythm.      Heart sounds: Normal heart sounds.   Pulmonary:      Effort: Pulmonary effort is normal. No respiratory distress.      Breath sounds: No wheezing or rhonchi.   Neurological:      Mental Status: He is alert.   Psychiatric:         Behavior: Behavior normal.         PFT's     07/17/2025: Mild obstruction. Refused bronchodilator. Lung volumes and DLCO were normal.           Labs:   personally reviewed in the EMR.     Latest Reference Range & Units 04/04/25 08:15   Absolute Eosinophils 0.0 - 0.7 10e3/uL 0.0       Imaging studies: personally reviewed and interpreted. Below are the Radiology interpretations.    No recent chest imaging.                               "

## 2025-07-17 NOTE — PATIENT INSTRUCTIONS
It was a pleasure seeing you in clinic today. This is what we discussed:    You have mild airway obstruction. Probably asthma.   We will try an inhaler, use the Symbicort 2 puffs twice daily. Rinse and gargle. Use this every day no matter what.   I have referred you to ENT  Get the blood work done when you have time.   Follow up 6 weeks   If you have worsening symptoms, questions, or need to speak with the nurse please call [phone], then ask to speak to lung clinic nurse.

## 2025-07-21 ENCOUNTER — PATIENT OUTREACH (OUTPATIENT)
Dept: CARE COORDINATION | Facility: CLINIC | Age: 35
End: 2025-07-21
Payer: COMMERCIAL

## 2025-07-24 ENCOUNTER — LAB (OUTPATIENT)
Dept: LAB | Facility: CLINIC | Age: 35
End: 2025-07-24
Payer: COMMERCIAL

## 2025-07-24 DIAGNOSIS — J30.2 SEASONAL ALLERGIC RHINITIS, UNSPECIFIED TRIGGER: ICD-10-CM

## 2025-07-24 DIAGNOSIS — J45.30 MILD PERSISTENT ASTHMA WITHOUT COMPLICATION: ICD-10-CM

## 2025-07-24 LAB
BASOPHILS # BLD AUTO: 0 10E3/UL (ref 0–0.2)
BASOPHILS NFR BLD AUTO: 0 %
EOSINOPHIL # BLD AUTO: 0.2 10E3/UL (ref 0–0.7)
EOSINOPHIL NFR BLD AUTO: 3 %
ERYTHROCYTE [DISTWIDTH] IN BLOOD BY AUTOMATED COUNT: 12.7 % (ref 10–15)
HCT VFR BLD AUTO: 44.8 % (ref 40–53)
HGB BLD-MCNC: 15.1 G/DL (ref 13.3–17.7)
IMM GRANULOCYTES # BLD: 0 10E3/UL
IMM GRANULOCYTES NFR BLD: 0 %
LYMPHOCYTES # BLD AUTO: 2.1 10E3/UL (ref 0.8–5.3)
LYMPHOCYTES NFR BLD AUTO: 31 %
MCH RBC QN AUTO: 29.4 PG (ref 26.5–33)
MCHC RBC AUTO-ENTMCNC: 33.7 G/DL (ref 31.5–36.5)
MCV RBC AUTO: 87 FL (ref 78–100)
MONOCYTES # BLD AUTO: 0.6 10E3/UL (ref 0–1.3)
MONOCYTES NFR BLD AUTO: 8 %
NEUTROPHILS # BLD AUTO: 4 10E3/UL (ref 1.6–8.3)
NEUTROPHILS NFR BLD AUTO: 58 %
PLATELET # BLD AUTO: 220 10E3/UL (ref 150–450)
RBC # BLD AUTO: 5.13 10E6/UL (ref 4.4–5.9)
WBC # BLD AUTO: 6.9 10E3/UL (ref 4–11)

## 2025-08-07 ENCOUNTER — OFFICE VISIT (OUTPATIENT)
Dept: OTOLARYNGOLOGY | Facility: CLINIC | Age: 35
End: 2025-08-07
Payer: COMMERCIAL

## 2025-08-07 DIAGNOSIS — J30.2 SEASONAL ALLERGIC RHINITIS, UNSPECIFIED TRIGGER: ICD-10-CM

## 2025-08-07 DIAGNOSIS — J45.20 MILD INTERMITTENT REACTIVE AIRWAY DISEASE WITHOUT COMPLICATION: Primary | ICD-10-CM

## 2025-08-07 DIAGNOSIS — J34.2 NASAL SEPTAL DEVIATION: ICD-10-CM

## 2025-08-07 RX ORDER — AZELASTINE 1 MG/ML
1 SPRAY, METERED NASAL 2 TIMES DAILY
Qty: 30 ML | Refills: 11 | Status: SHIPPED | OUTPATIENT
Start: 2025-08-07

## 2025-08-07 RX ORDER — ALBUTEROL SULFATE 90 UG/1
2 INHALANT RESPIRATORY (INHALATION) EVERY 6 HOURS PRN
Qty: 18 G | Refills: 4 | Status: SHIPPED | OUTPATIENT
Start: 2025-08-07

## 2025-08-27 ENCOUNTER — MYC MEDICAL ADVICE (OUTPATIENT)
Dept: PULMONOLOGY | Facility: CLINIC | Age: 35
End: 2025-08-27
Payer: COMMERCIAL